# Patient Record
Sex: FEMALE | Race: WHITE | Employment: UNEMPLOYED | URBAN - METROPOLITAN AREA
[De-identification: names, ages, dates, MRNs, and addresses within clinical notes are randomized per-mention and may not be internally consistent; named-entity substitution may affect disease eponyms.]

---

## 2020-11-17 ENCOUNTER — HOSPITAL ENCOUNTER (OUTPATIENT)
Dept: MAMMOGRAPHY | Age: 55
Discharge: HOME | End: 2020-11-17
Attending: FAMILY MEDICINE
Payer: COMMERCIAL

## 2020-11-17 DIAGNOSIS — Z12.31 ENCOUNTER FOR SCREENING MAMMOGRAM FOR MALIGNANT NEOPLASM OF BREAST: ICD-10-CM

## 2020-11-17 PROCEDURE — 77063 BREAST TOMOSYNTHESIS BI: CPT | Performed by: FAMILY MEDICINE

## 2020-11-17 PROCEDURE — 77067 SCR MAMMO BI INCL CAD: CPT | Performed by: FAMILY MEDICINE

## 2020-12-13 ENCOUNTER — HOSPITAL ENCOUNTER (OUTPATIENT)
Age: 55
Discharge: HOME | End: 2020-12-13
Payer: COMMERCIAL

## 2020-12-13 VITALS
SYSTOLIC BLOOD PRESSURE: 107 MMHG | RESPIRATION RATE: 18 BRPM | TEMPERATURE: 98 F | DIASTOLIC BLOOD PRESSURE: 77 MMHG | HEART RATE: 103 BPM

## 2020-12-13 DIAGNOSIS — L72.3 INFECTED SEBACEOUS CYST: ICD-10-CM

## 2020-12-13 DIAGNOSIS — L08.9 INFECTED SEBACEOUS CYST: ICD-10-CM

## 2020-12-13 DIAGNOSIS — L02.415 ABSCESS OF RIGHT THIGH: Primary | ICD-10-CM

## 2020-12-13 PROCEDURE — 87077 CULTURE AEROBIC IDENTIFY: CPT | Performed by: NURSE PRACTITIONER

## 2020-12-13 PROCEDURE — 87205 SMEAR GRAM STAIN: CPT | Performed by: NURSE PRACTITIONER

## 2020-12-13 PROCEDURE — 87070 CULTURE OTHR SPECIMN AEROBIC: CPT | Performed by: NURSE PRACTITIONER

## 2020-12-13 PROCEDURE — 99204 OFFICE O/P NEW MOD 45 MIN: CPT

## 2020-12-13 PROCEDURE — 10061 I&D ABSCESS COMP/MULTIPLE: CPT

## 2020-12-13 PROCEDURE — 99214 OFFICE O/P EST MOD 30 MIN: CPT

## 2020-12-13 RX ORDER — SULFAMETHOXAZOLE AND TRIMETHOPRIM 800; 160 MG/1; MG/1
1 TABLET ORAL 2 TIMES DAILY
Qty: 14 TABLET | Refills: 0 | Status: SHIPPED | OUTPATIENT
Start: 2020-12-13 | End: 2020-12-20

## 2020-12-13 RX ORDER — VENLAFAXINE HYDROCHLORIDE 150 MG/1
150 CAPSULE, EXTENDED RELEASE ORAL 2 TIMES DAILY
COMMUNITY
Start: 2020-08-26

## 2020-12-13 RX ORDER — ATORVASTATIN CALCIUM 20 MG/1
TABLET, FILM COATED ORAL
COMMUNITY
End: 2021-10-11

## 2020-12-13 RX ORDER — MONTELUKAST SODIUM 10 MG/1
10 TABLET ORAL DAILY
COMMUNITY
Start: 2020-11-02

## 2020-12-13 RX ORDER — SULFAMETHOXAZOLE AND TRIMETHOPRIM 200; 40 MG/5ML; MG/5ML
SUSPENSION ORAL 2 TIMES DAILY
COMMUNITY
End: 2021-10-11

## 2020-12-13 RX ORDER — OXCARBAZEPINE 150 MG/1
150 TABLET, FILM COATED ORAL NIGHTLY
COMMUNITY
Start: 2020-11-23

## 2020-12-13 NOTE — ED PROVIDER NOTES
Patient Seen in: [facility]      History   Patient presents with:  Abscess    Stated Complaint: ABCESS ON RIGHT THIGH X 1 WK    HPI    27-year-old female presents for an infected cyst to the right thigh.   She has had a sebaceous/epidermal Cardiovascular:      Rate and Rhythm: Normal rate and regular rhythm. Pulses: Normal pulses. Heart sounds: Normal heart sounds. Pulmonary:      Effort: Pulmonary effort is normal.      Breath sounds: Normal breath sounds.    Skin:     General: thigh  (primary encounter diagnosis)  Infected sebaceous cyst    Disposition:  Discharge  12/13/2020  3:14 pm    Follow-up:  Carlos Georges [address]  [address]  [phone]      As needed          Medications Prescribed:

## 2020-12-13 NOTE — ED INITIAL ASSESSMENT (HPI)
Right posterior thigh- pt has a cyst x 10 yrs. Size increased yesterday. Redness started Tuesday. Pt on bactrim on day 3 of 7 days.  denies fever

## 2020-12-15 ENCOUNTER — HOSPITAL ENCOUNTER (OUTPATIENT)
Age: 55
Discharge: HOME | End: 2020-12-15
Payer: COMMERCIAL

## 2020-12-15 VITALS
RESPIRATION RATE: 16 BRPM | DIASTOLIC BLOOD PRESSURE: 90 MMHG | OXYGEN SATURATION: 97 % | SYSTOLIC BLOOD PRESSURE: 146 MMHG | TEMPERATURE: 98 F | HEART RATE: 96 BPM

## 2020-12-15 DIAGNOSIS — Z48.00 ABSCESS PACKING REMOVAL: ICD-10-CM

## 2020-12-15 DIAGNOSIS — Z51.89 WOUND CHECK, ABSCESS: Primary | ICD-10-CM

## 2020-12-15 PROCEDURE — 99211 OFF/OP EST MAY X REQ PHY/QHP: CPT

## 2020-12-15 NOTE — ED PROVIDER NOTES
Patient Seen in: [facility]      History   Patient presents with:  Wound Recheck    Stated Complaint: wound recheck    39-year-old female presents the immediate care for wound check and packing removal.  Patient states she had an abscess Vitals signs and nursing note reviewed. Constitutional:       Appearance: Normal appearance. HENT:      Head: Normocephalic.       Nose: Nose normal.      Mouth/Throat:      Mouth: Mucous membranes are moist.   Eyes:      Extraocular Movements: Extraocu

## 2020-12-16 RX ORDER — DOXYCYCLINE HYCLATE 100 MG/1
100 CAPSULE ORAL 2 TIMES DAILY
Qty: 14 CAPSULE | Refills: 0 | Status: SHIPPED | OUTPATIENT
Start: 2020-12-16 | End: 2020-12-23

## 2020-12-16 NOTE — ED NOTES
Pt called back she states she is still having pain , with slight oozing , with a purplish Jamul, denies any fever. Dr. Jackson Safer aware of symptoms states to started doxycyline and stop bactrim . Called pt back and relayed dr. Leslie Banks instruction .  To follow u

## 2021-04-06 ENCOUNTER — HOSPITAL ENCOUNTER (OUTPATIENT)
Dept: GENERAL RADIOLOGY | Age: 56
Discharge: HOME | End: 2021-04-06
Attending: FAMILY MEDICINE
Payer: COMMERCIAL

## 2021-04-06 DIAGNOSIS — M25.561 RIGHT KNEE PAIN: ICD-10-CM

## 2021-04-06 PROCEDURE — 73560 X-RAY EXAM OF KNEE 1 OR 2: CPT | Performed by: FAMILY MEDICINE

## 2021-10-11 ENCOUNTER — HOSPITAL ENCOUNTER (OUTPATIENT)
Age: 56
Discharge: HOME | End: 2021-10-11
Payer: COMMERCIAL

## 2021-10-11 VITALS
WEIGHT: 203 LBS | DIASTOLIC BLOOD PRESSURE: 86 MMHG | OXYGEN SATURATION: 95 % | RESPIRATION RATE: 16 BRPM | SYSTOLIC BLOOD PRESSURE: 137 MMHG | HEART RATE: 89 BPM | BODY MASS INDEX: 35.97 KG/M2 | HEIGHT: 63 IN | TEMPERATURE: 98 F

## 2021-10-11 DIAGNOSIS — L03.221 CELLULITIS OF NECK: Primary | ICD-10-CM

## 2021-10-11 PROCEDURE — 99213 OFFICE O/P EST LOW 20 MIN: CPT

## 2021-10-11 RX ORDER — CEPHALEXIN 500 MG/1
500 CAPSULE ORAL 4 TIMES DAILY
Qty: 28 CAPSULE | Refills: 0 | Status: SHIPPED | OUTPATIENT
Start: 2021-10-11 | End: 2021-10-18

## 2021-10-11 RX ORDER — VALACYCLOVIR HYDROCHLORIDE 1 G/1
1 TABLET, FILM COATED ORAL 3 TIMES DAILY
Qty: 21 TABLET | Refills: 0 | Status: SHIPPED | OUTPATIENT
Start: 2021-10-11 | End: 2021-10-18

## 2021-10-11 NOTE — ED PROVIDER NOTES
Patient Seen in: [facility]      History   Patient presents with:  Rash Skin Problem    Stated Complaint: shingles    Subjective: This is a 47-year-old female with below stated medical history.   Presents to immediate care for rash on th for stated complaint: shingles  Other systems are as noted in HPI. Constitutional and vital signs reviewed. All other systems reviewed and negative except as noted above.     Physical Exam     ED Triage Vitals [10/11/21 0954]   /86   Pulse 89 normal.               ED Course   Labs Reviewed - No data to display       DC home. [facility]      Vital signs stable. Patient is well-appearing and nontoxic looking.   Presents to immediate care for itchy patch of erythema on the right side of her neck

## 2021-10-11 NOTE — ED INITIAL ASSESSMENT (HPI)
Patient reports area of redness to right side of neck/below jaw x 10-14 days. Concerned it may be shingles. Itching and burning present.

## 2022-07-06 ENCOUNTER — HOSPITAL ENCOUNTER (OUTPATIENT)
Dept: MAMMOGRAPHY | Age: 57
Discharge: HOME | End: 2022-07-06
Attending: FAMILY MEDICINE
Payer: COMMERCIAL

## 2022-07-06 DIAGNOSIS — Z12.31 ENCOUNTER FOR SCREENING MAMMOGRAM FOR MALIGNANT NEOPLASM OF BREAST: ICD-10-CM

## 2022-07-06 PROCEDURE — 77067 SCR MAMMO BI INCL CAD: CPT | Performed by: FAMILY MEDICINE

## 2022-07-06 PROCEDURE — 77063 BREAST TOMOSYNTHESIS BI: CPT | Performed by: FAMILY MEDICINE

## 2022-07-11 ENCOUNTER — HOSPITAL ENCOUNTER (OUTPATIENT)
Dept: BONE DENSITY | Age: 57
Discharge: HOME | End: 2022-07-11
Attending: FAMILY MEDICINE
Payer: COMMERCIAL

## 2022-07-11 DIAGNOSIS — M81.0 OSTEOPOROSIS: ICD-10-CM

## 2022-07-11 PROCEDURE — 77080 DXA BONE DENSITY AXIAL: CPT | Performed by: FAMILY MEDICINE

## 2022-10-26 RX ORDER — SIMVASTATIN 20 MG
TABLET ORAL
Qty: 90 TABLET | OUTPATIENT
Start: 2022-10-26

## 2022-10-26 RX ORDER — SIMVASTATIN 20 MG
TABLET ORAL
Qty: 30 TABLET | Refills: 0 | Status: SHIPPED | OUTPATIENT
Start: 2022-10-26 | End: 2022-11-25

## 2022-11-09 ENCOUNTER — HOSPITAL ENCOUNTER (OUTPATIENT)
Age: 57
Discharge: HOME | End: 2022-11-09
Attending: STUDENT IN AN ORGANIZED HEALTH CARE EDUCATION/TRAINING PROGRAM
Payer: COMMERCIAL

## 2022-11-09 VITALS
OXYGEN SATURATION: 94 % | HEIGHT: 63 IN | BODY MASS INDEX: 35.44 KG/M2 | DIASTOLIC BLOOD PRESSURE: 84 MMHG | WEIGHT: 200 LBS | TEMPERATURE: 98 F | SYSTOLIC BLOOD PRESSURE: 150 MMHG | HEART RATE: 108 BPM | RESPIRATION RATE: 20 BRPM

## 2022-11-09 DIAGNOSIS — T78.40XA ALLERGIC REACTION, INITIAL ENCOUNTER: Primary | ICD-10-CM

## 2022-11-09 PROCEDURE — 99213 OFFICE O/P EST LOW 20 MIN: CPT

## 2022-11-09 RX ORDER — PREDNISONE 20 MG/1
40 TABLET ORAL ONCE
Status: COMPLETED | OUTPATIENT
Start: 2022-11-09 | End: 2022-11-09

## 2022-11-09 RX ORDER — PREDNISONE 20 MG/1
40 TABLET ORAL DAILY
Qty: 10 TABLET | Refills: 0 | Status: SHIPPED | OUTPATIENT
Start: 2022-11-09 | End: 2022-11-14

## 2022-11-09 RX ORDER — CETIRIZINE HYDROCHLORIDE 1 MG/ML
5 SOLUTION ORAL DAILY
COMMUNITY

## 2022-11-09 NOTE — DISCHARGE INSTRUCTIONS
Return to the emergency room immediately if you develop shortness of breath, wheezing sensation of throat closing with associated rash, vomiting or diarrhea

## 2022-11-09 NOTE — ED INITIAL ASSESSMENT (HPI)
Rash- on the face, torso, both arms, both groin, c/o itching . Pt was prescribed new medication prescribed back in October took it for 5-6 days pt developed  Constipation and  sleepiness then stooped taking it. After her vacation she restarted oct 29  She developed rash which started Monday 11/7/22 . Pt called her psychiatrist and was advised to go to the Immediate care. Took benadryl, and anti itch lotion but no relief.

## 2022-11-25 RX ORDER — SIMVASTATIN 20 MG
TABLET ORAL
Qty: 90 TABLET | OUTPATIENT
Start: 2022-11-25

## 2022-11-25 RX ORDER — SIMVASTATIN 20 MG
TABLET ORAL
Qty: 30 TABLET | Refills: 0 | Status: SHIPPED | OUTPATIENT
Start: 2022-11-25

## 2022-12-19 RX ORDER — SIMVASTATIN 20 MG
TABLET ORAL
Qty: 30 TABLET | Refills: 0 | OUTPATIENT
Start: 2022-12-19

## 2023-04-18 ENCOUNTER — APPOINTMENT (OUTPATIENT)
Dept: GENERAL RADIOLOGY | Age: 58
End: 2023-04-18
Attending: NURSE PRACTITIONER
Payer: COMMERCIAL

## 2023-04-18 ENCOUNTER — HOSPITAL ENCOUNTER (OUTPATIENT)
Age: 58
Discharge: HOME | End: 2023-04-18
Payer: COMMERCIAL

## 2023-04-18 VITALS
HEIGHT: 63 IN | DIASTOLIC BLOOD PRESSURE: 98 MMHG | RESPIRATION RATE: 20 BRPM | BODY MASS INDEX: 35.79 KG/M2 | OXYGEN SATURATION: 95 % | SYSTOLIC BLOOD PRESSURE: 135 MMHG | HEART RATE: 85 BPM | WEIGHT: 202 LBS | TEMPERATURE: 97 F

## 2023-04-18 DIAGNOSIS — J06.9 UPPER RESPIRATORY TRACT INFECTION, UNSPECIFIED TYPE: Primary | ICD-10-CM

## 2023-04-18 DIAGNOSIS — R05.3 PERSISTENT COUGH: ICD-10-CM

## 2023-04-18 LAB — SARS-COV-2 RNA RESP QL NAA+PROBE: NOT DETECTED

## 2023-04-18 PROCEDURE — 71046 X-RAY EXAM CHEST 2 VIEWS: CPT | Performed by: NURSE PRACTITIONER

## 2023-04-18 PROCEDURE — 99214 OFFICE O/P EST MOD 30 MIN: CPT

## 2023-04-18 PROCEDURE — 94664 DEMO&/EVAL PT USE INHALER: CPT

## 2023-04-18 RX ORDER — BENZONATATE 100 MG/1
100 CAPSULE ORAL 3 TIMES DAILY PRN
Qty: 30 CAPSULE | Refills: 0 | Status: SHIPPED | OUTPATIENT
Start: 2023-04-18 | End: 2023-05-18

## 2023-04-18 RX ORDER — ALBUTEROL SULFATE 90 UG/1
2 AEROSOL, METERED RESPIRATORY (INHALATION) EVERY 4 HOURS PRN
Qty: 1 EACH | Refills: 0 | Status: SHIPPED | OUTPATIENT
Start: 2023-04-18 | End: 2023-05-18

## 2023-04-18 RX ORDER — PREDNISONE 20 MG/1
20 TABLET ORAL 2 TIMES DAILY
Qty: 10 TABLET | Refills: 0 | Status: SHIPPED | OUTPATIENT
Start: 2023-04-18 | End: 2023-04-23

## 2023-05-05 RX ORDER — MONTELUKAST SODIUM 10 MG/1
TABLET ORAL
Qty: 90 TABLET | Refills: 0 | Status: SHIPPED | OUTPATIENT
Start: 2023-05-05

## 2023-08-01 RX ORDER — MONTELUKAST SODIUM 10 MG/1
TABLET ORAL
Qty: 90 TABLET | Refills: 0 | OUTPATIENT
Start: 2023-08-01

## 2023-09-10 PROBLEM — G25.71 AKATHISIA: Status: ACTIVE | Noted: 2023-09-10

## 2023-09-10 PROBLEM — E78.1 HYPERTRIGLYCERIDEMIA: Status: ACTIVE | Noted: 2023-09-10

## 2023-09-11 ENCOUNTER — OFFICE VISIT (OUTPATIENT)
Dept: FAMILY MEDICINE CLINIC | Facility: CLINIC | Age: 58
End: 2023-09-11
Payer: COMMERCIAL

## 2023-09-11 ENCOUNTER — LAB ENCOUNTER (OUTPATIENT)
Dept: LAB | Age: 58
End: 2023-09-11
Attending: FAMILY MEDICINE
Payer: COMMERCIAL

## 2023-09-11 VITALS
SYSTOLIC BLOOD PRESSURE: 108 MMHG | WEIGHT: 201 LBS | HEIGHT: 63 IN | OXYGEN SATURATION: 96 % | DIASTOLIC BLOOD PRESSURE: 80 MMHG | RESPIRATION RATE: 16 BRPM | TEMPERATURE: 98 F | HEART RATE: 88 BPM | BODY MASS INDEX: 35.61 KG/M2

## 2023-09-11 DIAGNOSIS — Z00.00 LABORATORY EXAM ORDERED AS PART OF ROUTINE GENERAL MEDICAL EXAMINATION: ICD-10-CM

## 2023-09-11 DIAGNOSIS — J30.89 NON-SEASONAL ALLERGIC RHINITIS, UNSPECIFIED TRIGGER: ICD-10-CM

## 2023-09-11 DIAGNOSIS — M25.562 CHRONIC PAIN OF BOTH KNEES: ICD-10-CM

## 2023-09-11 DIAGNOSIS — Z00.00 ENCOUNTER FOR MEDICAL EXAMINATION TO ESTABLISH CARE: Primary | ICD-10-CM

## 2023-09-11 DIAGNOSIS — R73.01 IMPAIRED FASTING GLUCOSE: ICD-10-CM

## 2023-09-11 DIAGNOSIS — N89.8 VAGINAL IRRITATION: ICD-10-CM

## 2023-09-11 DIAGNOSIS — Z23 IMMUNIZATION DUE: ICD-10-CM

## 2023-09-11 DIAGNOSIS — G89.29 CHRONIC PAIN OF BOTH KNEES: ICD-10-CM

## 2023-09-11 DIAGNOSIS — Z12.4 ENCOUNTER FOR SCREENING FOR CERVICAL CANCER: ICD-10-CM

## 2023-09-11 DIAGNOSIS — M79.644 CHRONIC PAIN OF RIGHT THUMB: ICD-10-CM

## 2023-09-11 DIAGNOSIS — M79.644 PAIN IN RIGHT FINGER(S): ICD-10-CM

## 2023-09-11 DIAGNOSIS — N94.11 SUPERFICIAL DYSPAREUNIA: ICD-10-CM

## 2023-09-11 DIAGNOSIS — M25.561 CHRONIC PAIN OF BOTH KNEES: ICD-10-CM

## 2023-09-11 DIAGNOSIS — G89.29 CHRONIC PAIN OF RIGHT THUMB: ICD-10-CM

## 2023-09-11 LAB
ALBUMIN SERPL-MCNC: 3.7 G/DL (ref 3.4–5)
ALBUMIN/GLOB SERPL: 0.9 {RATIO} (ref 1–2)
ALP LIVER SERPL-CCNC: 95 U/L
ALT SERPL-CCNC: 51 U/L
ANION GAP SERPL CALC-SCNC: 5 MMOL/L (ref 0–18)
AST SERPL-CCNC: 30 U/L (ref 15–37)
BASOPHILS # BLD AUTO: 0.05 X10(3) UL (ref 0–0.2)
BASOPHILS NFR BLD AUTO: 0.8 %
BILIRUB SERPL-MCNC: 0.2 MG/DL (ref 0.1–2)
BUN BLD-MCNC: 12 MG/DL (ref 7–18)
CALCIUM BLD-MCNC: 9.8 MG/DL (ref 8.5–10.1)
CHLORIDE SERPL-SCNC: 107 MMOL/L (ref 98–112)
CHOLEST SERPL-MCNC: 296 MG/DL (ref ?–200)
CO2 SERPL-SCNC: 28 MMOL/L (ref 21–32)
CREAT BLD-MCNC: 0.74 MG/DL
EGFRCR SERPLBLD CKD-EPI 2021: 93 ML/MIN/1.73M2 (ref 60–?)
EOSINOPHIL # BLD AUTO: 0.35 X10(3) UL (ref 0–0.7)
EOSINOPHIL NFR BLD AUTO: 5.5 %
ERYTHROCYTE [DISTWIDTH] IN BLOOD BY AUTOMATED COUNT: 13.9 %
FASTING PATIENT LIPID ANSWER: YES
FASTING STATUS PATIENT QL REPORTED: YES
GLOBULIN PLAS-MCNC: 4 G/DL (ref 2.8–4.4)
GLUCOSE BLD-MCNC: 103 MG/DL (ref 70–99)
HCT VFR BLD AUTO: 47.4 %
HDLC SERPL-MCNC: 45 MG/DL (ref 40–59)
HGB BLD-MCNC: 15.3 G/DL
IMM GRANULOCYTES # BLD AUTO: 0.01 X10(3) UL (ref 0–1)
IMM GRANULOCYTES NFR BLD: 0.2 %
LDLC SERPL CALC-MCNC: 176 MG/DL (ref ?–100)
LYMPHOCYTES # BLD AUTO: 2.71 X10(3) UL (ref 1–4)
LYMPHOCYTES NFR BLD AUTO: 42.5 %
MCH RBC QN AUTO: 30.7 PG (ref 26–34)
MCHC RBC AUTO-ENTMCNC: 32.3 G/DL (ref 31–37)
MCV RBC AUTO: 95.2 FL
MONOCYTES # BLD AUTO: 0.51 X10(3) UL (ref 0.1–1)
MONOCYTES NFR BLD AUTO: 8 %
NEUTROPHILS # BLD AUTO: 2.74 X10 (3) UL (ref 1.5–7.7)
NEUTROPHILS # BLD AUTO: 2.74 X10(3) UL (ref 1.5–7.7)
NEUTROPHILS NFR BLD AUTO: 43 %
NONHDLC SERPL-MCNC: 251 MG/DL (ref ?–130)
OSMOLALITY SERPL CALC.SUM OF ELEC: 290 MOSM/KG (ref 275–295)
PLATELET # BLD AUTO: 325 10(3)UL (ref 150–450)
POTASSIUM SERPL-SCNC: 4.4 MMOL/L (ref 3.5–5.1)
PROT SERPL-MCNC: 7.7 G/DL (ref 6.4–8.2)
RBC # BLD AUTO: 4.98 X10(6)UL
SODIUM SERPL-SCNC: 140 MMOL/L (ref 136–145)
TRIGL SERPL-MCNC: 382 MG/DL (ref 30–149)
TSI SER-ACNC: 2.15 MIU/ML (ref 0.36–3.74)
VLDLC SERPL CALC-MCNC: 80 MG/DL (ref 0–30)
WBC # BLD AUTO: 6.4 X10(3) UL (ref 4–11)

## 2023-09-11 PROCEDURE — 36415 COLL VENOUS BLD VENIPUNCTURE: CPT

## 2023-09-11 PROCEDURE — 80061 LIPID PANEL: CPT

## 2023-09-11 PROCEDURE — 85025 COMPLETE CBC W/AUTO DIFF WBC: CPT

## 2023-09-11 PROCEDURE — 80053 COMPREHEN METABOLIC PANEL: CPT

## 2023-09-11 PROCEDURE — 87624 HPV HI-RISK TYP POOLED RSLT: CPT | Performed by: FAMILY MEDICINE

## 2023-09-11 PROCEDURE — 83036 HEMOGLOBIN GLYCOSYLATED A1C: CPT

## 2023-09-11 PROCEDURE — 88175 CYTOPATH C/V AUTO FLUID REDO: CPT | Performed by: FAMILY MEDICINE

## 2023-09-11 PROCEDURE — 84443 ASSAY THYROID STIM HORMONE: CPT

## 2023-09-11 RX ORDER — MONTELUKAST SODIUM 10 MG/1
10 TABLET ORAL DAILY
Qty: 90 TABLET | Refills: 3 | Status: SHIPPED | OUTPATIENT
Start: 2023-09-11

## 2023-09-11 RX ORDER — CLOBETASOL PROPIONATE 0.5 MG/G
OINTMENT TOPICAL
Qty: 45 G | Refills: 0 | Status: SHIPPED | OUTPATIENT
Start: 2023-09-11

## 2023-09-12 ENCOUNTER — TELEPHONE (OUTPATIENT)
Dept: FAMILY MEDICINE CLINIC | Facility: CLINIC | Age: 58
End: 2023-09-12

## 2023-09-12 ENCOUNTER — MED REC SCAN ONLY (OUTPATIENT)
Dept: FAMILY MEDICINE CLINIC | Facility: CLINIC | Age: 58
End: 2023-09-12

## 2023-09-12 LAB
EST. AVERAGE GLUCOSE BLD GHB EST-MCNC: 128 MG/DL (ref 68–126)
HBA1C MFR BLD: 6.1 % (ref ?–5.7)
HPV I/H RISK 1 DNA SPEC QL NAA+PROBE: NEGATIVE

## 2023-09-14 PROBLEM — E78.2 MIXED HYPERLIPIDEMIA: Status: ACTIVE | Noted: 2023-09-14

## 2023-10-10 ENCOUNTER — OFFICE VISIT (OUTPATIENT)
Dept: FAMILY MEDICINE CLINIC | Facility: CLINIC | Age: 58
End: 2023-10-10
Payer: COMMERCIAL

## 2023-10-10 VITALS
HEART RATE: 90 BPM | HEIGHT: 64 IN | RESPIRATION RATE: 16 BRPM | OXYGEN SATURATION: 96 % | TEMPERATURE: 97 F | SYSTOLIC BLOOD PRESSURE: 124 MMHG | WEIGHT: 199 LBS | BODY MASS INDEX: 33.97 KG/M2 | DIASTOLIC BLOOD PRESSURE: 82 MMHG

## 2023-10-10 DIAGNOSIS — R73.03 PREDIABETES: ICD-10-CM

## 2023-10-10 DIAGNOSIS — Z12.31 ENCOUNTER FOR SCREENING MAMMOGRAM FOR MALIGNANT NEOPLASM OF BREAST: ICD-10-CM

## 2023-10-10 DIAGNOSIS — E78.2 MIXED HYPERLIPIDEMIA: ICD-10-CM

## 2023-10-10 DIAGNOSIS — E66.09 CLASS 1 OBESITY DUE TO EXCESS CALORIES WITH SERIOUS COMORBIDITY AND BODY MASS INDEX (BMI) OF 34.0 TO 34.9 IN ADULT: ICD-10-CM

## 2023-10-10 DIAGNOSIS — F31.9 BIPOLAR AFFECTIVE DISORDER, REMISSION STATUS UNSPECIFIED (HCC): ICD-10-CM

## 2023-10-10 DIAGNOSIS — Z00.01 ENCOUNTER FOR GENERAL ADULT MEDICAL EXAMINATION WITH ABNORMAL FINDINGS: Primary | ICD-10-CM

## 2023-10-10 DIAGNOSIS — K21.9 GERD WITHOUT ESOPHAGITIS: ICD-10-CM

## 2023-10-10 PROCEDURE — 99213 OFFICE O/P EST LOW 20 MIN: CPT | Performed by: FAMILY MEDICINE

## 2023-10-10 PROCEDURE — 99396 PREV VISIT EST AGE 40-64: CPT | Performed by: FAMILY MEDICINE

## 2023-10-10 PROCEDURE — 3074F SYST BP LT 130 MM HG: CPT | Performed by: FAMILY MEDICINE

## 2023-10-10 PROCEDURE — 3008F BODY MASS INDEX DOCD: CPT | Performed by: FAMILY MEDICINE

## 2023-10-10 PROCEDURE — 3079F DIAST BP 80-89 MM HG: CPT | Performed by: FAMILY MEDICINE

## 2023-10-10 RX ORDER — PANTOPRAZOLE SODIUM 20 MG/1
20 TABLET, DELAYED RELEASE ORAL
Qty: 90 TABLET | Refills: 1 | Status: SHIPPED | OUTPATIENT
Start: 2023-10-10

## 2023-10-27 ENCOUNTER — HOSPITAL ENCOUNTER (OUTPATIENT)
Dept: MAMMOGRAPHY | Age: 58
Discharge: HOME | End: 2023-10-27
Attending: FAMILY MEDICINE

## 2023-10-27 DIAGNOSIS — Z12.31 ENCOUNTER FOR SCREENING MAMMOGRAM FOR MALIGNANT NEOPLASM OF BREAST: ICD-10-CM

## 2023-10-27 PROCEDURE — 77063 BREAST TOMOSYNTHESIS BI: CPT | Performed by: FAMILY MEDICINE

## 2023-10-27 PROCEDURE — 77067 SCR MAMMO BI INCL CAD: CPT | Performed by: FAMILY MEDICINE

## 2024-01-09 RX ORDER — PANTOPRAZOLE SODIUM 20 MG/1
20 TABLET, DELAYED RELEASE ORAL
Qty: 90 TABLET | Refills: 0 | Status: SHIPPED | OUTPATIENT
Start: 2024-01-09

## 2024-01-09 NOTE — TELEPHONE ENCOUNTER
LOV 10/10/2023     LAST LAB 9/12/23     LAST RX   pantoprazole 20 MG Oral Tab EC 90 tablet 1 10/10/2023 --   Sig:   Take 1 tablet (20 mg total) by mouth every morning before breakfast         Next OV No future appointments.     PROTOCOL none

## 2024-03-10 DIAGNOSIS — E78.2 MIXED HYPERLIPIDEMIA: ICD-10-CM

## 2024-03-11 RX ORDER — SIMVASTATIN 20 MG
20 TABLET ORAL NIGHTLY
Qty: 90 TABLET | Refills: 0 | Status: SHIPPED | OUTPATIENT
Start: 2024-03-11

## 2024-03-11 NOTE — TELEPHONE ENCOUNTER
Cholesterol Medication Protocol Vreblc15/10/2024 05:53 AM   Protocol Details ALT < 80    ALT resulted within past year    Lipid panel within past 12 months    In person appointment or virtual visit in the past 12 mos or appointment in next 3 mos        LOV 10/10/23     LAST LAB  9/11/23    LAST RX 9/14/23 90     Next OV No future appointments.      PROTOCOL pass

## 2024-05-25 ENCOUNTER — HOSPITAL ENCOUNTER (OUTPATIENT)
Age: 59
Discharge: HOME | End: 2024-05-25
Attending: STUDENT IN AN ORGANIZED HEALTH CARE EDUCATION/TRAINING PROGRAM

## 2024-05-25 ENCOUNTER — APPOINTMENT (OUTPATIENT)
Dept: GENERAL RADIOLOGY | Age: 59
End: 2024-05-25
Attending: PHYSICIAN ASSISTANT

## 2024-05-25 VITALS
RESPIRATION RATE: 20 BRPM | HEART RATE: 92 BPM | TEMPERATURE: 97 F | HEIGHT: 63.5 IN | OXYGEN SATURATION: 96 % | WEIGHT: 207 LBS | SYSTOLIC BLOOD PRESSURE: 169 MMHG | DIASTOLIC BLOOD PRESSURE: 90 MMHG | BODY MASS INDEX: 36.22 KG/M2

## 2024-05-25 DIAGNOSIS — M79.671 PAIN OF RIGHT HEEL: Primary | ICD-10-CM

## 2024-05-25 PROCEDURE — 99213 OFFICE O/P EST LOW 20 MIN: CPT

## 2024-05-25 PROCEDURE — 73630 X-RAY EXAM OF FOOT: CPT | Performed by: PHYSICIAN ASSISTANT

## 2024-05-25 PROCEDURE — 73610 X-RAY EXAM OF ANKLE: CPT | Performed by: PHYSICIAN ASSISTANT

## 2024-05-25 RX ORDER — ARM BRACE
EACH MISCELLANEOUS
Qty: 1 EACH | Refills: 0 | Status: SHIPPED | OUTPATIENT
Start: 2024-05-25

## 2024-05-25 NOTE — ED INITIAL ASSESSMENT (HPI)
Pt. States her right ankle has been sore since last September however yesterday she was walking her dog and afterwards during the night her ankle became quite painful. She states she iced her ankle and it seemed to help a bit.

## 2024-05-25 NOTE — ED PROVIDER NOTES
Patient Seen in: [facility]      History     Chief Complaint   Patient presents with    Leg or Foot Injury     Stated Complaint: right ankle swollen, sore, shooting waves of pain    Subjective:   HPI    Patient is a 58-year-old female is presenting immediate care center with complaints of posterior calcaneus/heel pain.  Patient states he has been having pain on and off in that area for several months.  6 months if not longer.  Sometimes it acts up more than others.  Yesterday she went for a mile half walk with her dog and when she got home she rested and then after a few hours that she started noticing that the pain was increasing in that location.  She points to the posterior aspect of her ankle near the calcaneus.  No foot pain.  Mild lateral ankle pain but most of her pain seems to be in the location of the calcaneus.    Objective:   Past Medical History:    Allergic rhinitis    All year around, indoor & outdoor    Anxiety    Arthritis    Asthma (HCC)    Life long, not diagnosed until 1990s    Bipolar 2 disorder (HCC)    Calculus of kidney    3 incidents, last one in mid 1990s    Depression    Diagnosed 1994    Esophageal reflux    Hyperlipidemia    Kidney stones    Seasonal allergies              Past Surgical History:   Procedure Laterality Date    Cholecystectomy  2012    Full surgery with complications. 9 days in patients    Colonoscopy  2018    Hip surgery      left hip labral tear    Hymenotomy simple incision                  Social History     Socioeconomic History    Marital status:    Tobacco Use    Smoking status: Never    Smokeless tobacco: Never    Tobacco comments:     Life time 3 cigarettes   Vaping Use    Vaping status: Never Used   Substance and Sexual Activity    Alcohol use: Yes     Alcohol/week: 4.0 standard drinks of alcohol     Types: 1 Glasses of wine, 2 Cans of beer, 1 Shots of liquor per week     Comment: occasionally    Drug use: Not Currently     Types: Cannabis      Comment: Last use 1979   Other Topics Concern    Caffeine Concern No    Exercise No    Seat Belt No    Weight Concern Yes              Review of Systems   Musculoskeletal:         Ankle/heel pain       Positive for stated complaint: right ankle swollen, sore, shooting waves of pain  Other systems are as noted in HPI.  Constitutional and vital signs reviewed.      All other systems reviewed and negative except as noted above.    Physical Exam     ED Triage Vitals [05/25/24 1123]   BP (!) 169/90   Pulse 92   Resp 20   Temp 97 °F (36.1 °C)   Temp src Temporal   SpO2 96 %   O2 Device None (Room air)       Current Vitals:   Vital Signs  BP: (!) 169/90  Pulse: 92  Resp: 20  Temp: 97 °F (36.1 °C)  Temp src: Temporal    Oxygen Therapy  SpO2: 96 %  O2 Device: None (Room air)            Physical Exam  Vitals and nursing note reviewed.   Constitutional:       Appearance: Normal appearance.   Musculoskeletal:      Comments: Examination of the right ankle compared to left shows that there is no significant swelling or bruising or sign of trauma.  The patient has very minimal tenderness of the posterior aspect of the lateral malleoli region the right ankle.  The bony prominences of the medial lateral malleoli are soft nontender.  There is no tenderness upon palpation over the foot.  Good color strong dorsalis pedis and posterior tibialis.  The patient was placed in prone position.  She has no tenderness or sign of trauma along the calf or the Achilles tendon.  But she does have tenderness right where the Achilles tendon inserts on the heel on the lateral border.  Again no redness no swelling.  No sign of infection or trauma.  The remainder of the Achilles tendon is soft.  Bruce test shows good movement of the foot with plantarflexion   Skin:     General: Skin is warm and dry.      Capillary Refill: Capillary refill takes less than 2 seconds.   Neurological:      General: No focal deficit present.      Mental Status: She is  alert and oriented to person, place, and time.      Cranial Nerves: No cranial nerve deficit.      Motor: No weakness.               ED Course   Labs Reviewed - No data to display       ED Course as of 05/25/24 1624  ------------------------------------------------------------  Time: 05/25 1245  Comment: Discussed the above findings with the patient.  Will fit her for an Ace wrap to help support her foot and ankle.  Advised her to follow-up with the orthopedic surgeon     XR FOOT, COMPLETE (MIN 3 VIEWS), RIGHT (CPT=73630)    Result Date: 5/25/2024  PROCEDURE:  XR FOOT, COMPLETE (MIN 3 VIEWS), RIGHT (CPT=73630)  TECHNIQUE:  AP, oblique, and lateral views were obtained.  COMPARISON:  None.  INDICATIONS:  right ankle swollen, most of the pain seems to be in the calcaneal region where the Achilles tendon inserts laterally  PATIENT STATED HISTORY: (As transcribed by Technologist)  Patient has had chronic right ankle joint pain for several months.  Patient developed sharp pain in her right heel after walking her dog last night.    FINDINGS:  Scattered mild peripheral arthritis.  No fracture, expansile lesion or erosion.  Moderate-sized corticated plantar heel spur.  No adjacent soft tissue calcifications.  Small enthesophyte at the insertion of the Achilles tendon on the calcaneus.             CONCLUSION:  1. Calcaneal spurs. 2. Mild peripheral arthritis.    LOCATION:  [facility]   Dictated by (CST): Maynor Mack MD on 5/25/2024 at 12:25 PM     Finalized by (CST): Maynor Mack MD on 5/25/2024 at 12:25 PM       XR ANKLE (MIN 3 VIEWS), RIGHT (CPT=73610)    Result Date: 5/25/2024  PROCEDURE:  XR ANKLE (MIN 3 VIEWS), RIGHT (CPT=73610)  TECHNIQUE:  Three views were obtained.  COMPARISON:  None.  INDICATIONS:  right ankle swollen, sore, shooting waves of pain  PATIENT STATED HISTORY: (As transcribed by Technologist)  Patient has had chronic right ankle joint pain for several months.  Patient developed sharp pain in her right heel  after walking her dog last night.    FINDINGS:  Joint spaces are normal.  No fracture, expansile lesion or erosion.  Moderate-sized corticated plantar heel spur.  No adjacent soft tissue calcifications.  Small enthesophyte at the insertion of the Achilles tendon on calcaneus.             CONCLUSION:  Calcaneal spurs.   LOCATION:  [facility]   Dictated by (CST): Maynor Mack MD on 5/25/2024 at 12:24 PM     Finalized by (CST): Maynor Mack MD on 5/25/2024 at 12:24 PM               MDM      Pertinent Labs & Imaging studies reviewed. (See chart for details)  Differential diagnosis considered but not limited to: Sprain, strain, arthritis, tendinitis,  Patient coming in with the above complaint.  Above findings are noted.  Patient will be discharged home.  Patient instructed to follow-up with the orthopedic surgeon.  Patient placed into a Ace wrap to help support her foot but also provided her for a note to  a orthopedic shoe.  Very close follow-up with the orthopedic surgeon. Patient provided with pain medication. Radiology . Patient is comfortable with this plan.    Overall Pt looks good. Non-toxic, well-hydrated and in no respiratory distress. Vital signs are reassuring. Exam is reassuring. I do not believe pt  requires and additional  diagnostic studiesor intervention. I believe pt  can be discharged home to continue evaluation as an outpatient. Follow-up provider given. Discharge instructions given and reviewed. Return for any problems. All understand and agreewith the plan.    Please note that this report has been produced using speech recognition software and may contain errors related to that system including, but not limited to, errors in grammar, punctuation, and spelling, as well as words and phrases that possibly may have been recognized inappropriately.  If there are any questions or concerns, contact the dictating provider for clarification.                                    Medical Decision  Making  Problems Addressed:  Pain of right heel: undiagnosed new problem with uncertain prognosis        Disposition and Plan     Clinical Impression:  1. Pain of right heel         Disposition:  Discharge  5/25/2024 12:55 pm    Follow-up:  Bran Valle PA  [address] [place] [address]  [phone]    Call in 1 day            Medications Prescribed:  Discharge Medication List as of 5/25/2024 12:59 PM

## 2024-05-25 NOTE — DISCHARGE INSTRUCTIONS
Rest, take Tylenol or ibuprofen to help with pain.  Recommend using the orthopedic boot to help support your ankle and foot to reduce your pain.    Very close follow-up with orthopedic surgeon I provided you with or at least your primary care physician.    Monitor your blood pressure at home it was elevated during this visit    Return to ER or emergency room for new or worsening symptoms

## 2024-05-28 ENCOUNTER — TELEPHONE (OUTPATIENT)
Dept: ORTHOPEDICS CLINIC | Facility: CLINIC | Age: 59
End: 2024-05-28

## 2024-05-31 NOTE — TELEPHONE ENCOUNTER
As per Sincer, she should be rescheduled with Dr. Wilson  
LMOM asking patient to contact our office to get rescheduled with Dr. Wilson.   
LMOM asking patient to contact our office to get rescheduled with Dr. Wilson. I also let the patient know I will be canceling the appointment as well.   
Patient is coming in for right foot and heel bone spurs  Referred from Immediate care.    XRAY's in EP[facility]  Please advise if any additional imaging is needed    Future Appointments   Date Time Provider Department Center   6/3/2024 11:40 AM [id number]      
General

## 2024-06-11 ENCOUNTER — OFFICE VISIT (OUTPATIENT)
Dept: ORTHOPEDICS CLINIC | Facility: CLINIC | Age: 59
End: 2024-06-11
Payer: COMMERCIAL

## 2024-06-11 VITALS — WEIGHT: 207 LBS | BODY MASS INDEX: 36.22 KG/M2 | HEIGHT: 63.5 IN

## 2024-06-11 DIAGNOSIS — M77.51 RETROCALCANEAL BURSITIS (BACK OF HEEL), RIGHT: Primary | ICD-10-CM

## 2024-06-11 DIAGNOSIS — M76.61 ACHILLES TENDINITIS OF RIGHT LOWER EXTREMITY: ICD-10-CM

## 2024-06-11 RX ORDER — OXCARBAZEPINE 300 MG/1
300 TABLET, FILM COATED ORAL EVERY 24 HOURS
COMMUNITY
Start: 2024-05-29

## 2024-06-11 RX ORDER — VENLAFAXINE HYDROCHLORIDE 75 MG/1
75 CAPSULE, EXTENDED RELEASE ORAL DAILY
COMMUNITY
Start: 2024-05-29

## 2024-06-11 RX ORDER — TRAZODONE HYDROCHLORIDE 150 MG/1
150 TABLET ORAL NIGHTLY PRN
COMMUNITY
Start: 2024-05-29

## 2024-06-11 RX ORDER — BETAMETHASONE SODIUM PHOSPHATE AND BETAMETHASONE ACETATE 3; 3 MG/ML; MG/ML
4.2 INJECTION, SUSPENSION INTRA-ARTICULAR; INTRALESIONAL; INTRAMUSCULAR; SOFT TISSUE ONCE
Status: COMPLETED | OUTPATIENT
Start: 2024-06-11 | End: 2024-06-11

## 2024-06-11 RX ADMIN — BETAMETHASONE SODIUM PHOSPHATE AND BETAMETHASONE ACETATE 4.2 MG: 3; 3 INJECTION, SUSPENSION INTRA-ARTICULAR; INTRALESIONAL; INTRAMUSCULAR; SOFT TISSUE at 10:58:00

## 2024-06-11 NOTE — PROGRESS NOTES
[facility] New Patient Note    CC:   Chief Complaint   Patient presents with    Foot Pain     Right foot pain and heel pain;   DX with bone spur at ;   ONSET: August or Sept. Of 2023;  Pain Score: @peak 7       HPI: The patient is a 58 year old female who presents today with complaints of right heel pain back of heel area , more on outside.  Sometimes swell  Can be a burning type pain at times    Injury?  Not sure  No repetitive activity .  Walks , dancing and biking          Past Medical History:    Allergic rhinitis    All year around, indoor & outdoor    Anxiety    Arthritis    Asthma (HCC)    Life long, not diagnosed until 1990s    Bipolar 2 disorder (HCC)    Calculus of kidney    3 incidents, last one in mid 1990s    Depression    Diagnosed 1994    Esophageal reflux    Hyperlipidemia    Kidney stones    Seasonal allergies     Past Surgical History:   Procedure Laterality Date    Cholecystectomy  2012    Full surgery with complications. 9 days in patients    Colonoscopy  2018    Hip surgery      left hip labral tear    Hymenotomy simple incision       Current Outpatient Medications   Medication Sig Dispense Refill    traZODone 150 MG Oral Tab Take 1 tablet (150 mg total) by mouth nightly as needed for Sleep.      OXcarbazepine 300 MG Oral Tab Take 1 tablet (300 mg total) by mouth daily.      venlafaxine ER 75 MG Oral Capsule SR 24 Hr Take 1 capsule (75 mg total) by mouth daily.      Elastic Bandages & Supports (ACE ANKLE BRACE) Does not apply Misc Please provide patient with a CAM/walking boot. 1 each 0    SIMVASTATIN 20 MG Oral Tab TAKE 1 TABLET(20 MG) BY MOUTH EVERY NIGHT 90 tablet 0    pantoprazole 20 MG Oral Tab EC TAKE 1 TABLET(20 MG) BY MOUTH EVERY MORNING BEFORE BREAKFAST 90 tablet 0    clobetasol 0.05 % External Ointment Apply to the affected area on the Vulva nightly for 4 wks 45 g 0    montelukast 10 MG Oral Tab Take 1 tablet (10 mg total) by mouth daily. 90 tablet 3    cetirizine 1 MG/ML  Oral Solution Take 5 mL (5 mg total) by mouth daily.      Venlafaxine HCl  MG Oral Capsule SR 24 Hr Take 1 capsule (150 mg total) by mouth 2 (two) times daily.       Allergies   Allergen Reactions    Other UNKNOWN    Wellbutrin [Bupropion] OTHER (SEE COMMENTS)     Skin breaks out when touched    Lamictal [Lamotrigine] RASH    Penicillins RASH     Family History   Problem Relation Age of Onset    Depression Mother         Denial    Heart Disorder Mother         Pacemaker in late 80s    Lipids Mother         High triglycerides    Musculo-skelatal Disorder Mother         Osteoarthritis    Psychiatric Mother     Cancer Father         Kidney    Heart Disorder Maternal Grandmother         Angina    Stroke Maternal Grandmother     Cancer Maternal Grandfather         Naif    Depression Maternal Grandfather         Hospitalzed at least twice, received shock treatment in the 1930s    Psychiatric Maternal Grandfather     Cancer Paternal Grandmother     Breast Cancer Maternal Aunt 75        mid to late 70's    Breast Cancer Maternal Aunt 60     Social History     Occupational History    Not on file   Tobacco Use    Smoking status: Never    Smokeless tobacco: Never    Tobacco comments:     Life time 3 cigarettes   Vaping Use    Vaping status: Never Used   Substance and Sexual Activity    Alcohol use: Yes     Alcohol/week: 4.0 standard drinks of alcohol     Types: 1 Glasses of wine, 2 Cans of beer, 1 Shots of liquor per week     Comment: occasionally    Drug use: Not Currently     Types: Cannabis     Comment: Last use 1979    Sexual activity: Not on file        ROS:  Complete ROS reviewed by me and non-contributory to the chief complaint except as mentioned above.    Physical Exam:    Ht 5' 3.5\" (1.613 m)   Wt 207 lb (93.9 kg)   BMI 36.09 kg/m²       Exam right foot and ankle, tenderness about the posterior lateral aspect of the right heel and ankle.  Mild insertional pain at the Achilles tendon onto the posterior  calcaneus. No thickening of achilles tendon. No weakness.  No side to side compression pain of the heel.  Full range of motion of the ankle joint and subtalar joint  Sensation is intact sharp versus dull.  She can feel light touch to the tips of the toes  Palpable pedal pulses.  Hair growth is present.  Skin is supple and feet are well perfused and warm.    Strength is 5 out of 5 all muscle groups    Full range of motion and nonpainful motion of the ankle joint, subtalar joint, MP joints, and midfoot joints.     Imaging: X-rays show a large inferior calcaneal spur and small calcification within the Achilles tendon insertion.  Personally viewed, independently interpreted and radiology report read.      Assessment/Diagnoses:  Diagnoses and all orders for this visit:    Retrocalcaneal bursitis (back of heel), right    Achilles tendinitis of right lower extremity        Plan:  I reviewed imaging and exam findings with the patient.  We looked at her x-rays together and discussed the significance of the spur.  She has tightness of the Achilles tendon and insertional tendinitis but more painful is the bursitis posterior lateral aspect of the heel.    Offered an injection of steroid to see if this will calm it down she tolerated it well.    Risks and benefits discussed.  Sterile prep of affected heel.  Injection of .7cc of betamethasone phosphate to painful area of posterolateral heel.     Icing and rest of foot for 1-2 days    PT -  LE eval and treat  Modalities, graston and dry needling if available  Gait eval     Follow up if not improving -     Sangita Wilson, DPMPodiatric Surgery  [facility]  [address] [place] [address], [address] [address]   [address] [place] [address]   [address] [place] [address]  [email or web address]  [email or web address]  t: [phone]   f: [phone]              This document was partially prepared using Dragon Medical voice recognition software.

## 2024-07-21 DIAGNOSIS — N89.8 VAGINAL IRRITATION: ICD-10-CM

## 2024-07-24 NOTE — TELEPHONE ENCOUNTER
Please Review. Protocol Failed; No Protocol     Requested Prescriptions   Pending Prescriptions Disp Refills    clobetasol 0.05 % External Ointment 45 g 0     Sig: Apply to the affected area on the Vulva nightly for 4 wks       There is no refill protocol information for this order              Recent Outpatient Visits              1 month ago Retrocalcaneal bursitis (back of heel), right    [facility], [address] [place] [address] Sangita Wilson, ROSEMARY    Office Visit    9 months ago Encounter for general adult medical examination with abnormal findings    [facility], [address] [place], [place] [address]Nirali Smith,     Office Visit    10 months ago Encounter for medical examination to establish care    [address] Nirali Jeffers,     Office Visit

## 2024-07-25 RX ORDER — CLOBETASOL PROPIONATE 0.5 MG/G
OINTMENT TOPICAL
Qty: 45 G | Refills: 0 | Status: SHIPPED | OUTPATIENT
Start: 2024-07-25

## 2024-08-15 ENCOUNTER — TELEPHONE (OUTPATIENT)
Dept: PHYSICAL THERAPY | Facility: HOSPITAL | Age: 59
End: 2024-08-15

## 2024-08-19 NOTE — PROGRESS NOTES
LOWER EXTREMITY EVALUATION:     Diagnosis:   Retrocalcaneal bursitis (back of heel), right (M77.51)       Referring Provider: Sangita Wilson  Date of Evaluation:    8/20/2024    Precautions:  None Next MD visit:   none scheduled  Date of Surgery: n/a     PATIENT SUMMARY   Louann Castillo is a 59 year old female who presents to therapy today with complaints of (R) lateral heel pain that started last September and got worse overtime. One night it work her up and the \"pain was unbelievable and almost went to the ER\" but she went to urgent care the next day and was referred to a foot specialist. She was given a boot and used it for a few days and it did help. She is not taking any pain medicine. She is icing as needed. She had a cortisone shot 2 months ago and does not see a benefit from it. The pain is described as \"electrical burning\" at lateral heel. Stair descending is one of the most limited and painful activities.       Pt describes pain level current 3/10, at best 1-2/10, at worst 4-5/10.   Current functional limitations include stair negotiation, walking >1.5 miles, prolonged standing, and floor to stand transfers.     Louann describes prior level of function not restricted to daily walks with her dogs. Pt goals include return to walking pain free.    Past medical history was reviewed with Louann. Significant findings include  has a past medical history of Allergic rhinitis (1981), Anxiety, Arthritis (2021), Asthma (HCC), Bipolar 2 disorder (HCC), Calculus of kidney (1987), Depression (1975), Esophageal reflux (1995), Hyperlipidemia, Kidney stones, and Seasonal allergies., (L) hip labrum repair, BL knee arthritis, high cholesterol, Hand arthritis.       X-ray of (R) foot on 5/25/24: \"FINDINGS:  Scattered mild peripheral arthritis.  No fracture, expansile lesion or erosion.  Moderate-sized corticated plantar heel spur.  No adjacent soft tissue calcifications.  Small enthesophyte at the insertion of the Achilles  tendon on the   calcaneus. \"    (R) ankle on 5/245/24: \"Joint spaces are normal.  No fracture, expansile lesion or erosion.  Moderate-sized corticated plantar heel spur.  No adjacent soft tissue calcifications.  Small enthesophyte at the insertion of the Achilles tendon on calcaneus. \"    ASSESSMENT  Louann presents to physical therapy evaluation with primary c/o (R) lateral heel pain and edema. The results of the objective tests and measures show impairments in soft tissue restrictions, A/PROM of ankle DF, TC joint mobility, glut weakness, muscles and balance impairments between L and R LE. Pt's presents with greater deficits on contralateral side, therefore, her(R) achilles pain may be due to overcompensation. Functional deficits include but are not limited to stair negotiation, walking >1.5 miles, prolonged standing, and floor to stand transfers.  Signs and symptoms are consistent with diagnosis of (R) Retrocalcaneal bursitis. Pt and PT discussed evaluation findings, pathology, POC and HEP.  Pt voiced understanding and performs HEP correctly without reported pain. Skilled Physical Therapy is medically necessary to address the above impairments and reach functional goals.     OBJECTIVE:   Observation: Pes planus and ankle pronation L>R, increased edema at medial and lateral aspects of achilles tendon  Palpation: TTP at lateral achilles insertion and lateral retro calcaneal bursa  Sensation: WNL    AROM: (* denotes performed with pain)  Hip Knee Foot/Ankle   WNL's WNL's    DF: R 0; L 0  PF: R WNL*; L WNL  INV: R WNL; L WNL  EV: R Mild restriction; L WNL  Great toe ext: R 45 deg; L 45 deg     Accessory motion: Moderate hypomobility of (R) TC posterior glide    Flexibility:  Gastroc-soleus: R +; L +    Strength/MMT: (* denotes performed with pain)  Hip Knee Foot/Ankle   Flexion: R 4-/5; L 3+/5  Extension: R 3+/5; L 3+/5  Abduction: R 3+/5; L 3+/5  ER: R 4-/5; L 4-/5   Flexion: R 5/5; L 5/5  Extension: R 5/5; L 4/5     DF: R 4/5; L 4/5  PF: R 4/5*; L 4/5  INV: R 4/5; L 4/5  EV: R 4-/5; L 4+/5  Great toe ext: R 4/5; L 4/5  Great toe Flex: R 4/5; L 4/5       Gait: pt ambulates on level ground with normal mechanics    Balance:   SLS: R 21 sec, L 7 sec  Tandem: R 30 sec,  L 26 sec    Today’s Treatment and Response:   Pt education was provided on exam findings, treatment diagnosis, treatment plan, expectations, and prognosis. Pt was also provided recommendations for activity modifications, possible soreness after evaluation, modalities as needed [ice/heat], and importance of remaining active.  Patient was instructed in and issued a HEP for:     Access Code: [email or web address]   URL: [email or web address]  Date: 08/20/2024  Prepared by: Nohemi Carrasquillo    Exercises (10 min)  - Gastroc Stretch on Step  - 3 x daily - 7 x weekly - 3 sets - 20-30 seconds hold  - Standing Soleus Stretch on Step  - 3 x daily - 7 x weekly - 3 sets - 20-30 seconds hold  - Sidelying Hip Abduction  - 3 x daily - 5 x weekly - 3 sets - 10 reps    Manual Tech: STM to (R) gastroc and IASTM to achilles. PROM ankle in PARI/IN and DF to tolerance. (10 min)     Charges: PT Eval Low Complexity, Therex: 1 unit, Manual tech: 1 unit      Total Timed Treatment: 20 min     Total Treatment Time: 45 min     Based on clinical rationale and outcome measures, this evaluation involved Low Complexity .   PLAN OF CARE:    Goals: (to be met in 10 visits)  Pt will demonstrate improved DF AROM to >= 8 degrees to promote proper foot clearance during gait and greater ease descending stairs without compensation  Pt will have increased ankle strength to 5/5 throughout for improved ankle control with ADLs such as prolonged gait and stair negotiation (  Pt will have improved SLS to >15s on airex for increased ankle stability with ambulation on uneven surfaces such as gravel and grass   Pt will report <2/10 pain with work and home activities such as taking her dog for a 1.5 mile walk.     Pt will be independent and compliant with comprehensive HEP to maintain progress achieved in PT     Frequency / Duration: Patient will be seen for 1-2 x/week or a total of 10 visits over a 90 day period. Treatment will include: Manual Therapy, Neuromuscular Re-education, Therapeutic Activities, Therapeutic Exercise, Home Exercise Program instruction, and Modalities to include: Ultrasound and trigger point dry needling prn    Education or treatment limitation: None  Rehab Potential:good    LEFS Score  LEFS Score: 61.25 % (8/19/2024 12:12 PM)      Patient/Family/Caregiver was advised of these findings, precautions, and treatment options and has agreed to actively participate in planning and for this course of care.    Thank you for your referral. Please co-sign or sign and return this letter via fax as soon as possible to [phone]. If you have any questions, please contact me at Dept: [phone]    Sincerely,  Electronically signed by therapist: Nohemi Carrasquillo, PT  Physician's certification required: Yes  I certify the need for these services furnished under this plan of treatment and while under my care.    X___________________________________________________ Date____________________    Certification From: 8/19/2024  To:11/17/2024

## 2024-08-20 ENCOUNTER — OFFICE VISIT (OUTPATIENT)
Dept: PHYSICAL THERAPY | Age: 59
End: 2024-08-20
Attending: PODIATRIST
Payer: COMMERCIAL

## 2024-08-20 DIAGNOSIS — M77.51 RETROCALCANEAL BURSITIS (BACK OF HEEL), RIGHT: Primary | ICD-10-CM

## 2024-08-20 PROCEDURE — 97140 MANUAL THERAPY 1/> REGIONS: CPT | Performed by: PHYSICAL THERAPIST

## 2024-08-20 PROCEDURE — 97110 THERAPEUTIC EXERCISES: CPT | Performed by: PHYSICAL THERAPIST

## 2024-08-20 PROCEDURE — 97161 PT EVAL LOW COMPLEX 20 MIN: CPT | Performed by: PHYSICAL THERAPIST

## 2024-08-25 DIAGNOSIS — E78.2 MIXED HYPERLIPIDEMIA: ICD-10-CM

## 2024-08-25 RX ORDER — SIMVASTATIN 20 MG
20 TABLET ORAL NIGHTLY
Qty: 90 TABLET | Refills: 3 | Status: SHIPPED | OUTPATIENT
Start: 2024-08-25

## 2024-08-25 NOTE — TELEPHONE ENCOUNTER
Refill passed per [facility] protocol.    Requested Prescriptions   Pending Prescriptions Disp Refills    SIMVASTATIN 20 MG Oral Tab [Pharmacy Med Name: SIMVASTATIN 20MG TABLETS] 90 tablet 0     Sig: TAKE 1 TABLET(20 MG) BY MOUTH EVERY NIGHT       Cholesterol Medication Protocol Passed - 8/25/2024  5:49 AM        Passed - ALT < 80     Lab Results   Component Value Date    ALT 51 09/11/2023             Passed - ALT resulted within past year        Passed - Lipid panel within past 12 months     Lab Results   Component Value Date    CHOLEST 296 (H) 09/11/2023    TRIG 382 (H) 09/11/2023    HDL 45 09/11/2023     (H) 09/11/2023    VLDL 80 (H) 09/11/2023    NONHDLC 251 (H) 09/11/2023             Passed - In person appointment or virtual visit in the past 12 mos or appointment in next 3 mos     Recent Outpatient Visits              5 days ago Retrocalcaneal bursitis (back of heel), right    [facility] [place]    Office Visit    2 months ago Retrocalcaneal bursitis (back of heel), right    [facility], [address] [place] [address] Sangita Wilson DPM    Office Visit    10 months ago Encounter for general adult medical examination with abnormal findings    [address] Nirali Jeffers DO    Office Visit    11 months ago Encounter for medical examination to establish care    [address] Nirali Jeffers,     Office Visit          Future Appointments         Provider Department Appt Notes    In 2 days Nohemi Carrasquillo, PT [facility] [place] 5v 8/15 to 11/15  [employer] hMO  no c/p, 60v    In 1 week Nohemi Carrasquillo, PT [facility] [place] 5v 8/15 to 11/15  [employer] hMO  no c/p, 60v    In 3 weeks Nohemi Carrasquillo, PT [facility] [place] 5v 8/15 to 11/15  [employer] hMO  no c/p, 60v    In 1 month Nohemi Carrasquillo, PT [facility] [place]  Last Auth Visit  5v 8/15 to 11/15  [employer] hMO  no c/p, 60v    In 1 month Nohemi Carrasquillo, PT [facility] [place] Auth??  5v 8/15 to 11/15  [employer]BS hMO  no c/p, 60v    In 1 month Nohemi Carrasquillo, PT [facility] [place] Auth??  5v 8/15 to 11/15  [employer] hMO  no c/p, 60v    In 1 month Nohemi Carrasquillo, PT [facility] [place] Auth??  5v 8/15 to 11/15  [employer] hMO  no c/p, 60v    In 1 month Nohemi Carrasquillo, PT [facility] [place] Auth??  5v 8/15 to 11/15  [employer] hMO  no c/p, 60v    In 2 months Nohemi Carrasquillo, PT [facility] [place] Auth??  5v 8/15 to 11/15  [employer]BS hMO  no c/p, 60v                         Future Appointments         Provider Department Appt Notes    In 2 days Nohemi Carrasquillo, PT [facility] [place] 5v 8/15 to 11/15  [employer] hMO  no c/p, 60v    In 1 week Nohemi Carrasquillo, PT [facility] [place] 5v 8/15 to 11/15  [employer] hMO  no c/p, 60v    In 3 weeks Nohemi Carrasquillo, PT [facility] [place] 5v 8/15 to 11/15  [employer] hMO  no c/p, 60v    In 1 month Nohemi Carrasquillo, PT [facility] [place] Last Auth Visit  5v 8/15 to 11/15  [employer] hMO  no c/p, 60v    In 1 month Nohemi Carrasquillo, PT [facility] [place] Auth??  5v 8/15 to 11/15  [employer]BS hMO  no c/p, 60v    In 1 month Nohemi Carrasquillo, PT [facility] [place] Auth??  5v 8/15 to 11/15  [employer]BS hMO  no c/p, 60v    In 1 month Nohemi Carrasquillo, PT [facility] [place] Auth??  5v 8/15 to 11/15  BCBS hMO  no c/p, 60v    In 1 month Nohemi Carrasquillo, PT [facility] [place] Auth??  5v 8/15 to 11/15  BCBS hMO  no c/p, 60v    In 2 months Nohemi Carrasquillo, PT [facility] [place] Auth??  5v 8/15 to 11/15  [employer]BS hMO  no c/p, 60v            Recent Outpatient Visits              5 days ago Retrocalcaneal bursitis (back of heel), right    [facility] [place] Nohemi Carrasquillo, PT    Office Visit    2  months ago Retrocalcaneal bursitis (back of heel), right    [facility], [address] [place] [address] Sangita Wilson, ROSEMARY    Office Visit    10 months ago Encounter for general adult medical examination with abnormal findings    [facility], [address] [place] [address] Nirali Jeffers,     Office Visit    11 months ago Encounter for medical examination to establish care    [address] Nirali Jeffers,     Office Visit

## 2024-08-27 ENCOUNTER — OFFICE VISIT (OUTPATIENT)
Dept: PHYSICAL THERAPY | Age: 59
End: 2024-08-27
Attending: PODIATRIST
Payer: COMMERCIAL

## 2024-08-27 PROCEDURE — 97140 MANUAL THERAPY 1/> REGIONS: CPT | Performed by: PHYSICAL THERAPIST

## 2024-08-27 PROCEDURE — 97110 THERAPEUTIC EXERCISES: CPT | Performed by: PHYSICAL THERAPIST

## 2024-08-27 NOTE — PROGRESS NOTES
Diagnosis:   Retrocalcaneal bursitis (back of heel), right (M77.51)          Referring Provider: Sangita Wilson  Date of Evaluation:    8/20/24    Precautions:  None Next MD visit:   none scheduled  Date of Surgery: n/a   Insurance Primary/Secondary: [place] / N/A     # Auth Visits: 5 visits till 11/15/24            Subjective: Pt states she has been less fearful of going up and down the stairs and the exercises are working out good. She stood for a prolonged time at a concert this weekend and the pain did not get too bad.     Pain: 2/10 currently, reduced pain after manual tech      Objective:     AROM: (* denotes performed with pain)  Foot/Ankle   DF: R 0; L 0  PF: R WNL*; L WNL  INV: R WNL; L WNL  EV: R Mild restriction; L WNL  Great toe ext: R 45 deg; L 45 deg          Assessment: Pt presents with no major changes in ankle/gastroc flexibility. Performed STM to gastroc and achilles to promote improvements in ankle mobility. She ties out a heel lift in her shoe and reports an immediate reduction in pain and was recommended to purchase a heel lift. Continued to progress hip and ankle strengthening with good tolerance.       Goals:   (to be met in 10 visits)  Pt will demonstrate improved DF AROM to >= 8 degrees to promote proper foot clearance during gait and greater ease descending stairs without compensation  Pt will have increased ankle strength to 5/5 throughout for improved ankle control with ADLs such as prolonged gait and stair negotiation (  Pt will have improved SLS to >15s on airex for increased ankle stability with ambulation on uneven surfaces such as gravel and grass   Pt will report <2/10 pain with work and home activities such as taking her dog for a 1.5 mile walk.    Pt will be independent and compliant with comprehensive HEP to maintain progress achieved in PT     Plan: Progress with achilles gradual loading/stretching  Date: 8/27/2024  TX#: 2/5 approved Date:                 TX#: 3/ Date:                  TX#: 4/ Date:                 TX#: 5/ Date:   Tx#: 6/   Manual tech: 15 min  -STM to (R) gastroc and achilles  -sub talar mobs med/lat, level III  -PROM (R) ankle       TherEx: 30 min  -NuStep: 7 min  -testing a heel lift  -patient education  -s/l hip abd: x20 eahc side  -bridges: 2x15  -seated ankle DF/PF: x20  -seated heel slide: 2x10   -arch lifting: x20, 5 sec                       HEP:   Access Code: [email or web address]  URL: [email or web address]  Date: 08/27/2024  Prepared by: Nohemi Carrasquillo    Exercises  - Gastroc Stretch on Step  - 3 x daily - 7 x weekly - 3 sets - 20-30 seconds hold  - Standing Soleus Stretch on Step  - 3 x daily - 7 x weekly - 3 sets - 20-30 seconds hold  - Sidelying Hip Abduction  - 3 x daily - 5 x weekly - 3 sets - 10 reps  - Beginner Bridge  - 1 x daily - 5 x weekly - 3 sets - 10 reps  - Seated Heel Toe Raises  - 1 x daily - 5 x weekly - 3 sets - 10 reps  - Seated Heel Slide  - 1 x daily - 5 x weekly - 3 sets - 10 reps  - Seated Arch Lifts  - 1 x daily - 5 x weekly - 3 sets - 10 reps    Charges: TherEx: 2 units, Manual tech: 1 unit         Total Timed Treatment: 45 min  Total Treatment Time: 45 min

## 2024-09-03 NOTE — PROGRESS NOTES
Diagnosis:   Retrocalcaneal bursitis (back of heel), right (M77.51)          Referring Provider: Sangita Wilson  Date of Evaluation:    8/20/24    Precautions:  None Next MD visit:   none scheduled  Date of Surgery: n/a   Insurance Primary/Secondary: [place] / N/A     # Auth Visits: 5 visits till 11/15/24            Subjective: Pt states she feels the pain is a little higher up her leg today. States she likes the heel lift and they do help quite a bit. She states she was able to go up the stairs w/o holding on to the railing due to feeling better at her knee and ankle/achilles.     Pain: 1/10 currently, 3/10 walking this morning       Objective:     AROM: (* denotes performed with pain)  Foot/Ankle   DF: R 0 deg pre tx           R 3 deg post tx             Assessment: Pt is reporting functional improvements in ability to climb stairs w/o use of UE for support. She is progressed to weight bearing intrinsic and calf strengthening on leg press to promote gradual loading of the achilles tendon. She tolerates tx well.       Goals:   (to be met in 10 visits)  Pt will demonstrate improved DF AROM to >= 8 degrees to promote proper foot clearance during gait and greater ease descending stairs without compensation  Pt will have increased ankle strength to 5/5 throughout for improved ankle control with ADLs such as prolonged gait and stair negotiation (  Pt will have improved SLS to >15s on airex for increased ankle stability with ambulation on uneven surfaces such as gravel and grass   Pt will report <2/10 pain with work and home activities such as taking her dog for a 1.5 mile walk.    Pt will be independent and compliant with comprehensive HEP to maintain progress achieved in PT     Plan: Progress with achilles gradual loading/stretching  Date: 8/27/2024  TX#: 2/5 approved Date: 9/4/24               TX#: 3/5 approved Date:                 TX#: 4/ Date:                 TX#: 5/ Date:   Tx#: 6/   Manual tech: 15  min  -STM to (R) gastroc and achilles  -sub talar mobs med/lat, level III  -PROM (R) ankle Manual tech: 15 min  -STM to (R) gastroc and achilles  -sub talar mobs med/lat, level III        TherEx: 30 min  -NuStep: 7 min  -testing a heel lift  -patient education  -s/l hip abd: x20 eahc side  -bridges: 2x15  -seated ankle DF/PF: x20  -seated heel slide: 2x10   -arch lifting: x20, 5 sec   TherEx: 30 min  -NuStep: 6 min  -PROM (R) ankle  -seated eccentric calf stretch w/ Gray TB: 2x10 (slow DF)  -standing arch lifting: x20, 5 sec  -DL calf press: 50#, 2x10  -tandem balance: 2x30 sec each way                    HEP:   Access Code: [email or web address]  URL: [email or web address]  Date: 08/27/2024  Prepared by: Nohemi Carrasquillo    Exercises  - Gastroc Stretch on Step  - 3 x daily - 7 x weekly - 3 sets - 20-30 seconds hold  - Standing Soleus Stretch on Step  - 3 x daily - 7 x weekly - 3 sets - 20-30 seconds hold  - Sidelying Hip Abduction  - 3 x daily - 5 x weekly - 3 sets - 10 reps  - Beginner Bridge  - 1 x daily - 5 x weekly - 3 sets - 10 reps  - Seated Heel Toe Raises  - 1 x daily - 5 x weekly - 3 sets - 10 reps  - Seated Heel Slide  - 1 x daily - 5 x weekly - 3 sets - 10 reps  - Seated Arch Lifts  - 1 x daily - 5 x weekly - 3 sets - 10 reps    Charges: TherEx: 2 units, Manual tech: 1 unit         Total Timed Treatment: 45 min  Total Treatment Time: 45 min

## 2024-09-04 ENCOUNTER — OFFICE VISIT (OUTPATIENT)
Dept: PHYSICAL THERAPY | Age: 59
End: 2024-09-04
Attending: PODIATRIST
Payer: COMMERCIAL

## 2024-09-04 PROCEDURE — 97110 THERAPEUTIC EXERCISES: CPT | Performed by: PHYSICAL THERAPIST

## 2024-09-04 PROCEDURE — 97140 MANUAL THERAPY 1/> REGIONS: CPT | Performed by: PHYSICAL THERAPIST

## 2024-09-17 ENCOUNTER — OFFICE VISIT (OUTPATIENT)
Dept: PHYSICAL THERAPY | Age: 59
End: 2024-09-17
Attending: PODIATRIST
Payer: COMMERCIAL

## 2024-09-17 PROCEDURE — 97110 THERAPEUTIC EXERCISES: CPT | Performed by: PHYSICAL THERAPIST

## 2024-09-17 PROCEDURE — 97140 MANUAL THERAPY 1/> REGIONS: CPT | Performed by: PHYSICAL THERAPIST

## 2024-09-17 PROCEDURE — 97112 NEUROMUSCULAR REEDUCATION: CPT | Performed by: PHYSICAL THERAPIST

## 2024-09-17 NOTE — PROGRESS NOTES
Diagnosis:   Retrocalcaneal bursitis (back of heel), right (M77.51)          Referring Provider: Sangita Wilson  Date of Evaluation:    8/20/24    Precautions:  None Next MD visit:   none scheduled  Date of Surgery: n/a   Insurance Primary/Secondary: [place] / N/A     # Auth Visits: 5 visits till 11/15/24            Subjective: Pt states she performed repetitive stair negotiation to do a few load of laundry after her vacation and the heel pain did increase but in spurts through out the day. Today it is better.      Pain: 1-2/10 currently, 3-4/10 yesterday      Objective:     AROM: (* denotes performed with pain)  Foot/Ankle   PF: R 0 deg post tx             Assessment: Pt is educated don risk and benefits of trigger point dry needling and provides verbal consent to tx. She responds well to manual tech and reports relief post tx. She is progressed with balance exercises with good tolerance.        Goals:   (to be met in 10 visits)  Pt will demonstrate improved DF AROM to >= 8 degrees to promote proper foot clearance during gait and greater ease descending stairs without compensation  Pt will have increased ankle strength to 5/5 throughout for improved ankle control with ADLs such as prolonged gait and stair negotiation (  Pt will have improved SLS to >15s on airex for increased ankle stability with ambulation on uneven surfaces such as gravel and grass   Pt will report <2/10 pain with work and home activities such as taking her dog for a 1.5 mile walk.    Pt will be independent and compliant with comprehensive HEP to maintain progress achieved in PT     Plan: Progress with achilles gradual loading/stretching  Date: 8/27/2024  TX#: 2/5 approved Date: 9/4/24               TX#: 3/5 approved Date: 9/17/24                TX#: 4/5 Date:                 TX#: 5/ Date:   Tx#: 6/   Manual tech: 15 min  -STM to (R) gastroc and achilles  -sub talar mobs med/lat, level III  -PROM (R) ankle Manual tech: 15 min  -STM to (R)  gastroc and achilles  -sub talar mobs med/lat, level III   Manual tech: 25 min  -STM to (R) gastroc and achilles  -sub talar mobs med/lat, level III     TherEx: 30 min  -NuStep: 7 min  -testing a heel lift  -patient education  -s/l hip abd: x20 eahc side  -bridges: 2x15  -seated ankle DF/PF: x20  -seated heel slide: 2x10   -arch lifting: x20, 5 sec   TherEx: 30 min  -NuStep: 6 min  -PROM (R) ankle  -seated eccentric calf stretch w/ Gray TB: 2x10 (slow DF)  -standing arch lifting: x20, 5 sec  -DL calf press: 50#, 2x10  -tandem balance: 2x30 sec each way TherEx: 15 min  -NuStep: 6 min  -PROM (R) ankle  -pro stretch: 3x30 sec  -CKC ankle DF knee taps to wall: 2x10  -DL calf press: 50#, 2x10         Neuro re-ed: 13 min  -anterior/post weight shifts: x20  -standing arch lifting standing on airex: x20, 5 sec  -half tandem on airex: 2x30 sec each  -Marching in standing (w/ arch lift): x15 each side              HEP:   Access Code: [email or web address]  URL: [email or web address]  Date: 08/27/2024  Prepared by: Nohemi Carrasquillo    Exercises  - Gastroc Stretch on Step  - 3 x daily - 7 x weekly - 3 sets - 20-30 seconds hold  - Standing Soleus Stretch on Step  - 3 x daily - 7 x weekly - 3 sets - 20-30 seconds hold  - Sidelying Hip Abduction  - 3 x daily - 5 x weekly - 3 sets - 10 reps  - Beginner Bridge  - 1 x daily - 5 x weekly - 3 sets - 10 reps  - Seated Heel Toe Raises  - 1 x daily - 5 x weekly - 3 sets - 10 reps  - Seated Heel Slide  - 1 x daily - 5 x weekly - 3 sets - 10 reps  - Seated Arch Lifts  - 1 x daily - 5 x weekly - 3 sets - 10 reps    Charges: TherEx: 1 units, Manual tech: 2 unit, neuro re-ed: 1 unit        Total Timed Treatment: 53 min  Total Treatment Time: 53 min

## 2024-09-21 DIAGNOSIS — J30.89 NON-SEASONAL ALLERGIC RHINITIS, UNSPECIFIED TRIGGER: ICD-10-CM

## 2024-09-24 ENCOUNTER — OFFICE VISIT (OUTPATIENT)
Dept: PHYSICAL THERAPY | Age: 59
End: 2024-09-24
Attending: PODIATRIST
Payer: COMMERCIAL

## 2024-09-24 PROCEDURE — 97110 THERAPEUTIC EXERCISES: CPT | Performed by: PHYSICAL THERAPIST

## 2024-09-24 PROCEDURE — 97140 MANUAL THERAPY 1/> REGIONS: CPT | Performed by: PHYSICAL THERAPIST

## 2024-09-24 PROCEDURE — 97112 NEUROMUSCULAR REEDUCATION: CPT | Performed by: PHYSICAL THERAPIST

## 2024-09-24 NOTE — PROGRESS NOTES
Diagnosis:   Retrocalcaneal bursitis (back of heel), right (M77.51)          Referring Provider: Sangita Wilson  Date of Evaluation:    8/20/24    Precautions:  None Next MD visit:   none scheduled  Date of Surgery: n/a   Insurance Primary/Secondary: [place] / N/A     # Auth Visits: 5 visits till 11/15/24            Subjective: Pt states heel pain was OK day after last PT session. But still she feels occasionally sharp pain in the heel and happens at random and when going down the stairs.     Pain: 3/10 currently      Objective:     AROM: (* denotes performed with pain)  Foot/Ankle   PF: R +3 deg post tx             Assessment: Pt is progressed to standing heel raises and stepping exercises to improve tolerance to stair negotiation. She reports some increase I pain with step down but does not linger. Overall pt states her pain reduced from start to end of tx session.     Goals:   (to be met in 10 visits)  Pt will demonstrate improved DF AROM to >= 8 degrees to promote proper foot clearance during gait and greater ease descending stairs without compensation  Pt will have increased ankle strength to 5/5 throughout for improved ankle control with ADLs such as prolonged gait and stair negotiation (  Pt will have improved SLS to >15s on airex for increased ankle stability with ambulation on uneven surfaces such as gravel and grass   Pt will report <2/10 pain with work and home activities such as taking her dog for a 1.5 mile walk.    Pt will be independent and compliant with comprehensive HEP to maintain progress achieved in PT     Plan: Progress with achilles gradual loading/stretching  Date: 8/27/2024  TX#: 2/5 approved Date: 9/4/24               TX#: 3/5 approved Date: 9/17/24                TX#: 4/5 Date:9/24/24                 TX#: 5/5 Date:   Tx#: 6/   Manual tech: 15 min  -STM to (R) gastroc and achilles  -sub talar mobs med/lat, level III  -PROM (R) ankle Manual tech: 15 min  -STM to (R) gastroc and  achilles  -sub talar mobs med/lat, level III   Manual tech: 25 min  -STM to (R) gastroc and achilles  -sub talar mobs med/lat, level III Manual tech: 15 min  -STM/IASTM to (R) gastroc and achilles  -sub talar mobs med/lat, level III  -Trigger point dry needling to (R) medial/lateral gastroc ([id number])    TherEx: 30 min  -NuStep: 7 min  -testing a heel lift  -patient education  -s/l hip abd: x20 eahc side  -bridges: 2x15  -seated ankle DF/PF: x20  -seated heel slide: 2x10   -arch lifting: x20, 5 sec   TherEx: 30 min  -NuStep: 6 min  -PROM (R) ankle  -seated eccentric calf stretch w/ Gray TB: 2x10 (slow DF)  -standing arch lifting: x20, 5 sec  -DL calf press: 50#, 2x10  -tandem balance: 2x30 sec each way TherEx: 15 min  -NuStep: 6 min  -PROM (R) ankle  -pro stretch: 3x30 sec  -CKC ankle DF knee taps to wall: 2x10  -DL calf press: 50#, 2x10   TherEx: 20 min  -NuStep: 6 min  -PROM (R) ankle  -Calf stretch on step and soleus stretch: 3x30 sec  -DL heel raises: 2x10  -DL soleus raises: 2x10  -step up: 6\" step 2x10  -4\" step down: 2x10  -SL calf press: 37#, 2x10        Neuro re-ed: 13 min  -anterior/post weight shifts: x20  -standing arch lifting standing on airex: x20, 5 sec  -half tandem on airex: 2x30 sec each  -Marching in standing (w/ arch lift): x15 each side   Neuro re-ed: 10 min  -airex balance beam lateral walk: x4 cycles  -Wobble board taps A/P: x30 each side  -Marching in standing (w/ arch lift): x15 each side  -tandem: 2x30 sec           HEP:   Access Code: [email or web address]  URL: [email or web address]  Date: 09/24/2024  Prepared by: Nohemi Carrasquillo    Exercises  - Gastroc Stretch on Step  - 3 x daily - 7 x weekly - 3 sets - 20-30 seconds hold  - Standing Soleus Stretch on Step  - 3 x daily - 7 x weekly - 3 sets - 20-30 seconds hold  - Sidelying Hip Abduction  - 3 x daily - 5 x weekly - 3 sets - 10 reps  - Beginner Bridge  - 1 x daily - 5 x weekly - 3 sets - 10 reps  - Standing Heel Raises  - 1 x  daily - 4 x weekly - 3 sets - 10 reps  - Standing Marching  - 1 x daily - 5 x weekly - 3 sets - 10 reps  - Tandem Stance  - 1 x daily - 5 x weekly - 3 sets - 10 reps    Charges: TherEx: 1 units, Manual tech: 1 unit, neuro re-ed: 1 unit        Total Timed Treatment: 45 min  Total Treatment Time: 45 min

## 2024-09-26 DIAGNOSIS — J30.89 NON-SEASONAL ALLERGIC RHINITIS, UNSPECIFIED TRIGGER: ICD-10-CM

## 2024-09-26 RX ORDER — MONTELUKAST SODIUM 10 MG/1
10 TABLET ORAL DAILY
Qty: 30 TABLET | Refills: 0 | Status: SHIPPED | OUTPATIENT
Start: 2024-09-26 | End: 2024-10-24

## 2024-09-27 RX ORDER — MONTELUKAST SODIUM 10 MG/1
10 TABLET ORAL DAILY
Qty: 90 TABLET | Refills: 3 | OUTPATIENT
Start: 2024-09-27

## 2024-09-27 NOTE — TELEPHONE ENCOUNTER
Scheduled    Future Appointments   Date Time Provider Department Center   10/1/2024  9:45 AM Nohemi Carrasquillo, PT [facility]   10/8/2024  9:45 AM Nohemi Carrasquillo, PT [facility]   10/15/2024  9:45 AM Nohemi Carrasquillo, PT [facility]   10/22/2024  9:45 AM Nohemi Carrasquillo, PT [facility]   10/24/2024 10:00 AM Nirali Jeffers, DO [facility]   10/29/2024  9:45 AM Nohemi Carrasquillo, PT [facility]

## 2024-09-30 NOTE — PROGRESS NOTES
Diagnosis:   Retrocalcaneal bursitis (back of heel), right (M77.51)          Referring Provider: Sangita Wilson  Date of Evaluation:    8/20/24    Precautions:  None Next MD visit:   none scheduled  Date of Surgery: n/a   Insurance Primary/Secondary: [place] / N/A     # Auth Visits: 10 visits till 12/31/24            Subjective: Pt states pain is on/off. This weekend she traveled and did a lot a [place] the airports and walking [place] grassy terrain and gravel at an outdoor wedding. Every once in a while she would get a pain that \"feels electrical and burning\"     Pain: 0/10 currently      Objective:     AROM: (* denotes performed with pain)  Foot/Ankle   PF: R +3 deg post tx             Assessment: Pt is reporting improving tolerance to walking and ambulating over uneven surfaces with reduced instances of pain. Due to pt reporting \"electrical pain and burning in the heel\" decided to perform lumbar p/a's to r/o lumbar involvement. Her lower lumbar segments were stiff and painful but did not reproduce heel pain. She responds well to STM and manual stretches.    Goals:   (to be met in 10 visits)  Pt will demonstrate improved DF AROM to >= 8 degrees to promote proper foot clearance during gait and greater ease descending stairs without compensation  Pt will have increased ankle strength to 5/5 throughout for improved ankle control with ADLs such as prolonged gait and stair negotiation (  Pt will have improved SLS to >15s on airex for increased ankle stability with ambulation on uneven surfaces such as gravel and grass   Pt will report <2/10 pain with work and home activities such as taking her dog for a 1.5 mile walk.    Pt will be independent and compliant with comprehensive HEP to maintain progress achieved in PT     Plan: Progress with achilles gradual loading/stretching  Date: 8/27/2024  TX#: 2/5 approved Date: 9/4/24               TX#: 3/5 approved Date: 9/17/24                TX#: 4/5 Date:9/24/24                  TX#: 5/5 Date:10/1/24   Tx#: 6/10   Manual tech: 15 min  -STM to (R) gastroc and achilles  -sub talar mobs med/lat, level III  -PROM (R) ankle Manual tech: 15 min  -STM to (R) gastroc and achilles  -sub talar mobs med/lat, level III   Manual tech: 25 min  -STM to (R) gastroc and achilles  -sub talar mobs med/lat, level III Manual tech: 15 min  -STM/IASTM to (R) gastroc and achilles  -sub talar mobs med/lat, level III  -Trigger point dry needling to (R) medial/lateral gastroc ([id number]) Manual tech: 15 min  -STM/IASTM to (R) gastroc and achilles  -sub talar mobs med/lat, level III  -Gastoc stretches w/ heel assist  -lumbar p/a's    TherEx: 30 min  -NuStep: 7 min  -testing a heel lift  -patient education  -s/l hip abd: x20 eahc side  -bridges: 2x15  -seated ankle DF/PF: x20  -seated heel slide: 2x10   -arch lifting: x20, 5 sec   TherEx: 30 min  -NuStep: 6 min  -PROM (R) ankle  -seated eccentric calf stretch w/ Gray TB: 2x10 (slow DF)  -standing arch lifting: x20, 5 sec  -DL calf press: 50#, 2x10  -tandem balance: 2x30 sec each way TherEx: 15 min  -NuStep: 6 min  -PROM (R) ankle  -pro stretch: 3x30 sec  -CKC ankle DF knee taps to wall: 2x10  -DL calf press: 50#, 2x10   TherEx: 20 min  -NuStep: 6 min  -PROM (R) ankle  -Calf stretch on step and soleus stretch: 3x30 sec  -DL heel raises: 2x10  -DL soleus raises: 2x10  -step up: 6\" step 2x10  -4\" step down: 2x10  -SL calf press: 37#, 2x10   TherEx: 20 min  -NuStep: 6 min  -PROM (R) ankle  -step up: 6\" step 2x10 each side  -lateral step up: x10 each side  -4\" step down: 2x10  -SL calf press: 37#, 2x10     Neuro re-ed: 13 min  -anterior/post weight shifts: x20  -standing arch lifting standing on airex: x20, 5 sec  -half tandem on airex: 2x30 sec each  -Marching in standing (w/ arch lift): x15 each side   Neuro re-ed: 10 min  -airex balance beam lateral walk: x4 cycles  -Wobble board taps A/P: x30 each side  -Marching in standing (w/ arch lift): x15 each  side  -tandem: 2x30 sec Neuro re-ed: 10 min  -Marching in standing (w/ arch lift): x15 each side  -blaze pods SLB 4 way taps on floor: 3x30 sec  -1/2 tandem stance on airex with hand blaze pod taps(4 pods): 2x30 sec each side          HEP:   Access Code: [email or web address]  URL: [email or web address]  Date: 09/24/2024  Prepared by: Nohemi Carrasquillo    Exercises  - Gastroc Stretch on Step  - 3 x daily - 7 x weekly - 3 sets - 20-30 seconds hold  - Standing Soleus Stretch on Step  - 3 x daily - 7 x weekly - 3 sets - 20-30 seconds hold  - Sidelying Hip Abduction  - 3 x daily - 5 x weekly - 3 sets - 10 reps  - Beginner Bridge  - 1 x daily - 5 x weekly - 3 sets - 10 reps  - Standing Heel Raises  - 1 x daily - 4 x weekly - 3 sets - 10 reps  - Standing Marching  - 1 x daily - 5 x weekly - 3 sets - 10 reps  - Tandem Stance  - 1 x daily - 5 x weekly - 3 sets - 10 reps    Charges: TherEx: 1 units, Manual tech: 1 unit, neuro re-ed: 1 unit        Total Timed Treatment: 45 min  Total Treatment Time: 45 min

## 2024-10-01 ENCOUNTER — OFFICE VISIT (OUTPATIENT)
Dept: PHYSICAL THERAPY | Age: 59
End: 2024-10-01
Attending: PODIATRIST
Payer: COMMERCIAL

## 2024-10-01 PROCEDURE — 97110 THERAPEUTIC EXERCISES: CPT | Performed by: PHYSICAL THERAPIST

## 2024-10-01 PROCEDURE — 97140 MANUAL THERAPY 1/> REGIONS: CPT | Performed by: PHYSICAL THERAPIST

## 2024-10-01 PROCEDURE — 97112 NEUROMUSCULAR REEDUCATION: CPT | Performed by: PHYSICAL THERAPIST

## 2024-10-04 NOTE — PROGRESS NOTES
Diagnosis:   Retrocalcaneal bursitis (back of heel), right (M77.51)          Referring Provider: Sangita Wilson  Date of Evaluation:    8/20/24    Precautions:  None Next MD visit:   none scheduled  Date of Surgery: n/a   Insurance Primary/Secondary: [place] / N/A     # Auth Visits: 10 visits till 12/31/24            Subjective: Pt states she is having less  frequent sharp and burning pains in the heel.     Pain: 0/10 currently      Objective:     AROM: (* denotes performed with pain)  Foot/Ankle   PF: R +5 deg post tx             Assessment: Pt progressed to SLB and marching on airex to promote ankle stability for ambulating uneven surfaces. She progresses in SL keny press with increased setts and able to perform SL standing heel raises w/o major increase in pain today. She tolerates tx well.       Goals:   (to be met in 10 visits)  Pt will demonstrate improved DF AROM to >= 8 degrees to promote proper foot clearance during gait and greater ease descending stairs without compensation  Pt will have increased ankle strength to 5/5 throughout for improved ankle control with ADLs such as prolonged gait and stair negotiation (  Pt will have improved SLS to >15s on airex for increased ankle stability with ambulation on uneven surfaces such as gravel and grass   Pt will report <2/10 pain with work and home activities such as taking her dog for a 1.5 mile walk.    Pt will be independent and compliant with comprehensive HEP to maintain progress achieved in PT     Plan: Progress with achilles gradual loading/stretching  Date: 8/27/2024  TX#: 2/5 approved Date: 9/4/24               TX#: 3/5 approved Date: 9/17/24                TX#: 4/5 Date:9/24/24                 TX#: 5/5 Date:10/1/24   Tx#: 6/10 Date:10/8/24   Tx#: 8/10   Manual tech: 15 min  -STM to (R) gastroc and achilles  -sub talar mobs med/lat, level III  -PROM (R) ankle Manual tech: 15 min  -STM to (R) gastroc and achilles  -sub talar mobs med/lat, level  III   Manual tech: 25 min  -STM to (R) gastroc and achilles  -sub talar mobs med/lat, level III Manual tech: 15 min  -STM/IASTM to (R) gastroc and achilles  -sub talar mobs med/lat, level III  -Trigger point dry needling to (R) medial/lateral gastroc ([id number]) Manual tech: 15 min  -STM/IASTM to (R) gastroc and achilles  -sub talar mobs med/lat, level III  -Gastoc stretches w/ heel assist  -lumbar p/a's  Manual tech: 15 min  -STM/IASTM to (R) gastroc and achilles  -sub talar mobs med/lat, level III  -Gastoc stretches w/ heel assist     TherEx: 30 min  -NuStep: 7 min  -testing a heel lift  -patient education  -s/l hip abd: x20 eahc side  -bridges: 2x15  -seated ankle DF/PF: x20  -seated heel slide: 2x10   -arch lifting: x20, 5 sec   TherEx: 30 min  -NuStep: 6 min  -PROM (R) ankle  -seated eccentric calf stretch w/ Gray TB: 2x10 (slow DF)  -standing arch lifting: x20, 5 sec  -DL calf press: 50#, 2x10  -tandem balance: 2x30 sec each way TherEx: 15 min  -NuStep: 6 min  -PROM (R) ankle  -pro stretch: 3x30 sec  -CKC ankle DF knee taps to wall: 2x10  -DL calf press: 50#, 2x10   TherEx: 20 min  -NuStep: 6 min  -PROM (R) ankle  -Calf stretch on step and soleus stretch: 3x30 sec  -DL heel raises: 2x10  -DL soleus raises: 2x10  -step up: 6\" step 2x10  -4\" step down: 2x10  -SL calf press: 37#, 2x10   TherEx: 20 min  -NuStep: 6 min  -PROM (R) ankle  -step up: 6\" step 2x10 each side  -lateral step up: x10 each side  -4\" step down: 2x10  -SL calf press: 37#, 2x10 TherEx: 10 min  -NuStep: 6 min  -PROM (R) ankle  -4\" step down: 2x10  -SL calf press: 37#, 3x15  -DL heel raise on 1/2 FR: 2x to fatigue  -calf stretch on step end of tx: 2x30 sec      Neuro re-ed: 13 min  -anterior/post weight shifts: x20  -standing arch lifting standing on airex: x20, 5 sec  -half tandem on airex: 2x30 sec each  -Marching in standing (w/ arch lift): x15 each side   Neuro re-ed: 10 min  -airex balance beam lateral walk: x4 cycles  -Wobble board taps  A/P: x30 each side  -Marching in standing (w/ arch lift): x15 each side  -tandem: 2x30 sec Neuro re-ed: 10 min  -Marching in standing (w/ arch lift): x15 each side  -blaze pods SLB 4 way taps on floor: 3x30 sec  -1/2 tandem stance on airex with hand blaze pod taps(4 pods): 2x30 sec each side Neuro re-ed: 20 min  -SLB 3x30 sec  -Marching in standing on airex: x15 each side  -blaze pods SLB 4 way taps on floor: 2x30 sec each leg  -1/2 tandem stance on airex with hand blaze pod taps(4 pods): 2x30 sec each side  -airex balance beam lateral walk: 3 cycles           HEP:   Access Code: [email or web address]  URL: [email or web address]  Date: 09/24/2024  Prepared by: Nohemi Carrasquillo    Exercises  - Gastroc Stretch on Step  - 3 x daily - 7 x weekly - 3 sets - 20-30 seconds hold  - Standing Soleus Stretch on Step  - 3 x daily - 7 x weekly - 3 sets - 20-30 seconds hold  - Sidelying Hip Abduction  - 3 x daily - 5 x weekly - 3 sets - 10 reps  - Beginner Bridge  - 1 x daily - 5 x weekly - 3 sets - 10 reps  - Standing Heel Raises  - 1 x daily - 4 x weekly - 3 sets - 10 reps  - Standing Marching  - 1 x daily - 5 x weekly - 3 sets - 10 reps  - Tandem Stance  - 1 x daily - 5 x weekly - 3 sets - 10 reps    Charges: TherEx: 1 units, Manual tech: 1 unit, neuro re-ed: 1 unit        Total Timed Treatment: 45 min  Total Treatment Time: 45 min

## 2024-10-08 ENCOUNTER — OFFICE VISIT (OUTPATIENT)
Dept: PHYSICAL THERAPY | Age: 59
End: 2024-10-08
Attending: PODIATRIST
Payer: COMMERCIAL

## 2024-10-08 PROCEDURE — 97112 NEUROMUSCULAR REEDUCATION: CPT | Performed by: PHYSICAL THERAPIST

## 2024-10-08 PROCEDURE — 97140 MANUAL THERAPY 1/> REGIONS: CPT | Performed by: PHYSICAL THERAPIST

## 2024-10-08 PROCEDURE — 97110 THERAPEUTIC EXERCISES: CPT | Performed by: PHYSICAL THERAPIST

## 2024-10-14 NOTE — PROGRESS NOTES
Diagnosis:   Retrocalcaneal bursitis (back of heel), right (M77.51)          Referring Provider: Sangtia Wilson  Date of Evaluation:    8/20/24    Precautions:  None Next MD visit:   none scheduled  Date of Surgery: n/a   Insurance Primary/Secondary: [place] / N/A     # Auth Visits: 10 visits till 12/31/24            Subjective: Pt states her heel hurts some on/off. Her knee pain has become more bothersome than her heels but this has been going on for a while due to arthritis.     Pain: 0/10 currently, 2-3/10 at worst      Objective:     AROM: (* denotes performed with pain)  Foot/Ankle   PF: R +5 deg post tx             Assessment: Pt is demonstrating improving single leg stability and is progressed to wobble board SL taps A/P direction to promote improving gastroc control.  She tolerates tx well but with reports of \"calf burning and fatigue\". She is given a calf stretch before she continues with balance and strengthening activities.        Goals:   (to be met in 10 visits)  Pt will demonstrate improved DF AROM to >= 8 degrees to promote proper foot clearance during gait and greater ease descending stairs without compensation  Pt will have increased ankle strength to 5/5 throughout for improved ankle control with ADLs such as prolonged gait and stair negotiation (  Pt will have improved SLS to >15s on airex for increased ankle stability with ambulation on uneven surfaces such as gravel and grass   Pt will report <2/10 pain with work and home activities such as taking her dog for a 1.5 mile walk.    Pt will be independent and compliant with comprehensive HEP to maintain progress achieved in PT     Plan: PN next tx   Date: 9/17/24                TX#: 4/5 Date:9/24/24                 TX#: 5/5 Date:10/1/24   Tx#: 6/10 Date:10/8/24   Tx#: 8/10 Date:10/15/24   Tx#: 9/10   Manual tech: 25 min  -STM to (R) gastroc and achilles  -sub talar mobs med/lat, level III Manual tech: 15 min  -STM/IASTM to (R) gastroc and  achilles  -sub talar mobs med/lat, level III  -Trigger point dry needling to (R) medial/lateral gastroc ([id number]) Manual tech: 15 min  -STM/IASTM to (R) gastroc and achilles  -sub talar mobs med/lat, level III  -Gastoc stretches w/ heel assist  -lumbar p/a's  Manual tech: 15 min  -STM/IASTM to (R) gastroc and achilles  -sub talar mobs med/lat, level III  -Gastoc stretches w/ heel assist   Manual tech: 15 min  -STM/IASTM to (R) gastroc and achilles  -sub talar mobs med/lat, level III  -Gastoc stretches w/ heel assist   TherEx: 15 min  -NuStep: 6 min  -PROM (R) ankle  -pro stretch: 3x30 sec  -CKC ankle DF knee taps to wall: 2x10  -DL calf press: 50#, 2x10   TherEx: 20 min  -NuStep: 6 min  -PROM (R) ankle  -Calf stretch on step and soleus stretch: 3x30 sec  -DL heel raises: 2x10  -DL soleus raises: 2x10  -step up: 6\" step 2x10  -4\" step down: 2x10  -SL calf press: 37#, 2x10   TherEx: 20 min  -NuStep: 6 min  -PROM (R) ankle  -step up: 6\" step 2x10 each side  -lateral step up: x10 each side  -4\" step down: 2x10  -SL calf press: 37#, 2x10 TherEx: 10 min  -NuStep: 6 min  -PROM (R) ankle  -4\" step down: 2x10  -SL calf press: 37#, 3x15  -DL heel raise on 1/2 FR: 2x to fatigue  -calf stretch on step end of tx: 2x30 sec  TherEx: 15 min  -NuStep: 6 min  -PROM (R) ankle  -SL calf press: 37#, 3x15  -DL heel raise on 1/2 FR: 2x to fatigue  -calf stretch on step end of tx: 2x30 sec      Neuro re-ed: 13 min  -anterior/post weight shifts: x20  -standing arch lifting standing on airex: x20, 5 sec  -half tandem on airex: 2x30 sec each  -Marching in standing (w/ arch lift): x15 each side   Neuro re-ed: 10 min  -airex balance beam lateral walk: x4 cycles  -Wobble board taps A/P: x30 each side  -Marching in standing (w/ arch lift): x15 each side  -tandem: 2x30 sec Neuro re-ed: 10 min  -Marching in standing (w/ arch lift): x15 each side  -blaze pods SLB 4 way taps on floor: 3x30 sec  -1/2 tandem stance on airex with hand blaze pod  taps(4 pods): 2x30 sec each side Neuro re-ed: 20 min  -SLB 3x30 sec  -Marching in standing on airex: x15 each side  -blaze pods SLB 4 way taps on floor: 2x30 sec each leg  -1/2 tandem stance on airex with hand blaze pod taps(4 pods): 2x30 sec each side  -airex balance beam lateral walk: 3 cycles Neuro re-ed: 15 min  -SLB 3x30 sec  -Marching in standing on airex: x20 each side  -blaze pods SLB 4 way taps on floor: 2x30 sec  -1/2 tandem stance on airex with hand blaze pod taps(4 pods): 2x30 sec each side  -airex balance beam lateral walk: 3 cycles  -SL wobble board taps A/P: 2x20          HEP:   Access Code: [email or web address]  URL: [email or web address]  Date: 09/24/2024  Prepared by: Nohemi Carrasquillo    Exercises  - Gastroc Stretch on Step  - 3 x daily - 7 x weekly - 3 sets - 20-30 seconds hold  - Standing Soleus Stretch on Step  - 3 x daily - 7 x weekly - 3 sets - 20-30 seconds hold  - Sidelying Hip Abduction  - 3 x daily - 5 x weekly - 3 sets - 10 reps  - Beginner Bridge  - 1 x daily - 5 x weekly - 3 sets - 10 reps  - Standing Heel Raises  - 1 x daily - 4 x weekly - 3 sets - 10 reps  - Standing Marching  - 1 x daily - 5 x weekly - 3 sets - 10 reps  - Tandem Stance  - 1 x daily - 5 x weekly - 3 sets - 10 reps    Charges: TherEx: 1 units, Manual tech: 1 unit, neuro re-ed: 1 unit        Total Timed Treatment: 45 min  Total Treatment Time: 45 min

## 2024-10-15 ENCOUNTER — OFFICE VISIT (OUTPATIENT)
Dept: PHYSICAL THERAPY | Age: 59
End: 2024-10-15
Attending: PODIATRIST
Payer: COMMERCIAL

## 2024-10-15 PROCEDURE — 97110 THERAPEUTIC EXERCISES: CPT | Performed by: PHYSICAL THERAPIST

## 2024-10-15 PROCEDURE — 97140 MANUAL THERAPY 1/> REGIONS: CPT | Performed by: PHYSICAL THERAPIST

## 2024-10-15 PROCEDURE — 97112 NEUROMUSCULAR REEDUCATION: CPT | Performed by: PHYSICAL THERAPIST

## 2024-10-21 NOTE — PROGRESS NOTES
Progress Summary  Pt has attended 10 visits in Physical Therapy.    Diagnosis:   Retrocalcaneal bursitis (back of heel), right (M77.51)          Referring Provider: Sangita Wilson  Date of Evaluation:    8/20/24    Precautions:  None Next MD visit:   none scheduled  Date of Surgery: n/a   Insurance Primary/Secondary: [employer] [place] / N/A     # Auth Visits: 10 visits till 12/31/24            Subjective: Pt states she is no longer feeling constant pain. Pain still comes and goes and the sharp burning pain occurs less often. Balance has improved. Improved tolerance to stairs, ambulating over uneven surfaces, and walking distances like grocery shopping.     Pain: 0/10 currently, 2-3/10 at worst      Objective:     Palpation: TTP at lateral achilles insertion and lateral retro calcaneal bursa--> improved      AROM: (* denotes performed with pain)  Foot/Ankle   DF: R 5; L 0  PF: R WNL*; L WNL  INV: R WNL; L WNL  EV: R WNL; L WNL           Flexibility:  Gastroc-soleus: R +; L +--> improved      Strength/MMT: (* denotes performed with pain)  Hip Knee Foot/Ankle   Flexion: R 4-/5; L 4-/5  Extension: R 3+/5; L 3+/5  Abduction: R 3+/5; L 3+/5  ER: R 4/5; L 4/5    Flexion: R 5/5; L 5/5  Extension: R 5/5; L 4/5    DF: R 4+/5; L 4+/5  PF: R 4+/5; L 4+/5  INV: R 4+/5; L 4+/5  EV: R 4+/5; L 4+/5           Gait: pt ambulates on level ground with normal mechanics     Balance:   SLS: R 21 sec, L 7 sec        Assessment: Pt has been gradually progressing with reports of reducing pain frequency and intensity. She has demonstrates improved ankle ROM, gastroc flexibility, LE strength, and balance. These objective gains have allowed pt to walk longer distances, hike trail over uneven surfaces, and repetitive stair negotiation. She continues to demonstrate impairments in mild gastroc restrictions, mild ankle weakness, moderate hip weakness, and moderate impairment in balance. These impairments limit her in ability to walk >1 mile to for  dog walking/extended grocery shopping, ambulating over uneven surfaces and descending stairs. She will continue to benefit from skilled PT to promote return to PLOF.       Goals:   (to be met in 15 visits)  Pt will demonstrate improved DF AROM to >= 8 degrees to promote proper foot clearance during gait and greater ease descending stairs without compensation-Progressing   Pt will have increased ankle strength to 5/5 throughout for improved ankle control with ADLs such as prolonged gait and stair negotiation -Progressing  Pt will have improved SLS to >10s on airex for increased ankle stability with ambulation on uneven surfaces such as gravel and grass-Progressing   Pt will report <2/10 pain with work and home activities such as taking her dog for a 1.5 mile walk.-Progressing     Pt will be independent and compliant with comprehensive HEP to maintain progress achieved in PT-on going        Plan: Continue skilled Physical Therapy 1-2 x/week or a total of 5 visits over a 90 day period. Treatment will include: TherEx, Neuro re-ed, manual tech       Patient/Family/Caregiver was advised of these findings, precautions, and treatment options and has agreed to actively participate in planning and for this course of care.    Thank you for your referral. If you have any questions, please contact me at Dept: [phone].    Sincerely,  Electronically signed by therapist: Nohemi Carrasquillo, PT     Physician's certification required:  Yes  Please co-sign or sign and return this letter via fax as soon as possible to [phone].   I certify the need for these services furnished under this plan of treatment and while under my care.    X___________________________________________________ Date____________________    Certification From: 10/21/2024  To:1/19/2025    Date: 9/17/24                TX#: 4/5 Date:9/24/24                 TX#: 5/5 Date:10/1/24   Tx#: 6/10 Date:10/8/24   Tx#: 8/10 Date:10/15/24   Tx#: 9/10 Date:10/22/24   Tx#:  10/10   Manual tech: 25 min  -STM to (R) gastroc and achilles  -sub talar mobs med/lat, level III Manual tech: 15 min  -STM/IASTM to (R) gastroc and achilles  -sub talar mobs med/lat, level III  -Trigger point dry needling to (R) medial/lateral gastroc (0.0twl28nt) Manual tech: 15 min  -STM/IASTM to (R) gastroc and achilles  -sub talar mobs med/lat, level III  -Gastoc stretches w/ heel assist  -lumbar p/a's  Manual tech: 15 min  -STM/IASTM to (R) gastroc and achilles  -sub talar mobs med/lat, level III  -Gastoc stretches w/ heel assist   Manual tech: 15 min  -STM/IASTM to (R) gastroc and achilles  -sub talar mobs med/lat, level III  -Gastoc stretches w/ heel assist Manual tech: 15 min  -STM/IASTM to (R) gastroc and achilles  -sub talar mobs med/lat, level III  -Gastoc stretches w/ heel assist   TherEx: 15 min  -NuStep: 6 min  -PROM (R) ankle  -pro stretch: 3x30 sec  -CKC ankle DF knee taps to wall: 2x10  -DL calf press: 50#, 2x10   TherEx: 20 min  -NuStep: 6 min  -PROM (R) ankle  -Calf stretch on step and soleus stretch: 3x30 sec  -DL heel raises: 2x10  -DL soleus raises: 2x10  -step up: 6\" step 2x10  -4\" step down: 2x10  -SL calf press: 37#, 2x10   TherEx: 20 min  -NuStep: 6 min  -PROM (R) ankle  -step up: 6\" step 2x10 each side  -lateral step up: x10 each side  -4\" step down: 2x10  -SL calf press: 37#, 2x10 TherEx: 10 min  -NuStep: 6 min  -PROM (R) ankle  -4\" step down: 2x10  -SL calf press: 37#, 3x15  -DL heel raise on 1/2 FR: 2x to fatigue  -calf stretch on step end of tx: 2x30 sec  TherEx: 15 min  -NuStep: 6 min  -PROM (R) ankle  -SL calf press: 37#, 3x15  -DL heel raise on 1/2 FR: 2x to fatigue  -calf stretch on step end of tx: 2x30 sec    TherEx: 15 min  -NuStep: 6 min  -PROM (R) ankle  -Re-assessment  -DL heel raise on 1/2 FR: 2x to fatigue  -calf stretch on step end of tx: 2x30 sec         Hold  SL RDL's   Eccentric gastroc  SL heel raise hold at bar   Neuro re-ed: 13 min  -anterior/post weight shifts:  x20  -standing arch lifting standing on airex: x20, 5 sec  -half tandem on airex: 2x30 sec each  -Marching in standing (w/ arch lift): x15 each side   Neuro re-ed: 10 min  -airex balance beam lateral walk: x4 cycles  -Wobble board taps A/P: x30 each side  -Marching in standing (w/ arch lift): x15 each side  -tandem: 2x30 sec Neuro re-ed: 10 min  -Marching in standing (w/ arch lift): x15 each side  -blaze pods SLB 4 way taps on floor: 3x30 sec  -1/2 tandem stance on airex with hand blaze pod taps(4 pods): 2x30 sec each side Neuro re-ed: 20 min  -SLB 3x30 sec  -Marching in standing on airex: x15 each side  -blaze pods SLB 4 way taps on floor: 2x30 sec each leg  -1/2 tandem stance on airex with hand blaze pod taps(4 pods): 2x30 sec each side  -airex balance beam lateral walk: 3 cycles Neuro re-ed: 15 min  -SLB 3x30 sec  -Marching in standing on airex: x20 each side  -blaze pods SLB 4 way taps on floor: 2x30 sec  -1/2 tandem stance on airex with hand blaze pod taps(4 pods): 2x30 sec each side  -airex balance beam lateral walk: 3 cycles  -SL wobble board taps A/P: 2x20 Neuro re-ed: 15 min  -modified tandem on airex w/ head rotations: 2x20 each  -SLB 3 way taps: 2 x to fatigue   -blaze pods SLB 4 way taps on floor: 2x30 sec  -SL wobble board taps A/P: 2x20           HEP:   Access Code: [email or web address]  URL: [email or web address]  Date: 10/22/2024  Prepared by: Nohemi Carrasquillo    Exercises  - Gastroc Stretch on Step  - 3 x daily - 7 x weekly - 3 sets - 20-30 seconds hold  - Standing Soleus Stretch on Step  - 3 x daily - 7 x weekly - 3 sets - 20-30 seconds hold  - Sidelying Hip Abduction  - 3 x daily - 5 x weekly - 3 sets - 10 reps  - Beginner Bridge  - 1 x daily - 5 x weekly - 3 sets - 10 reps  - Standing Heel Raises  - 1 x daily - 5 x weekly - 3 sets - 10 reps  - Half Tandem Stance Balance with Head Rotation  - 1 x daily - 5 x weekly - 3 sets - 10 reps  - Single Leg Stance  - 1 x daily - 5 x weekly - 3 sets - 30  seconds hold  - Single Leg Balance with Clock Reach  - 1 x daily - 5 x weekly - 2 sets    Charges: TherEx: 1 units, Manual tech: 1 unit, neuro re-ed: 1 unit        Total Timed Treatment: 45 min  Total Treatment Time: 45 min

## 2024-10-22 ENCOUNTER — OFFICE VISIT (OUTPATIENT)
Dept: PHYSICAL THERAPY | Age: 59
End: 2024-10-22
Attending: PODIATRIST
Payer: COMMERCIAL

## 2024-10-22 PROCEDURE — 97140 MANUAL THERAPY 1/> REGIONS: CPT | Performed by: PHYSICAL THERAPIST

## 2024-10-22 PROCEDURE — 97112 NEUROMUSCULAR REEDUCATION: CPT | Performed by: PHYSICAL THERAPIST

## 2024-10-22 PROCEDURE — 97110 THERAPEUTIC EXERCISES: CPT | Performed by: PHYSICAL THERAPIST

## 2024-10-24 DIAGNOSIS — J30.89 NON-SEASONAL ALLERGIC RHINITIS, UNSPECIFIED TRIGGER: ICD-10-CM

## 2024-10-24 RX ORDER — MONTELUKAST SODIUM 10 MG/1
10 TABLET ORAL DAILY
Qty: 90 TABLET | Refills: 0 | Status: SHIPPED | OUTPATIENT
Start: 2024-10-24

## 2024-10-24 NOTE — TELEPHONE ENCOUNTER
Triage call transferred.   Spoke with pt stating missed CPE today (10/24) and has been re-scheduled in December.   Pt requesting refill in the meantime-  montelukast 10 MG Oral Tab 30 tablet 0 9/26/2024 --   Sig:   Take 1 tablet (10 mg total) by mouth daily.       Confirmed [facility] on file. Pharmacy should notify pt when ready for . Pt verbalized understanding and agreed with POC.    Rx pended for your approval if ok.  Please review and advise. Thank you.

## 2024-10-25 RX ORDER — MONTELUKAST SODIUM 10 MG/1
10 TABLET ORAL DAILY
Qty: 30 TABLET | Refills: 0 | OUTPATIENT
Start: 2024-10-25

## 2024-10-25 NOTE — TELEPHONE ENCOUNTER
Please review. Protocol Failed; No Protocol   Future appointment:    12/12/2024 10:00 AM Nirali Jeffers DO [facility]         Requested Prescriptions   Pending Prescriptions Disp Refills    pantoprazole 20 MG Oral Tab EC 90 tablet 0     Sig: Take 1 tablet (20 mg total) by mouth before breakfast.       Gastrointestional Medication Protocol Passed - 10/25/2024  4:41 PM        Passed - In person appointment or virtual visit in the past 12 mos or appointment in next 3 mos     Recent Outpatient Visits              3 days ago     [facility] [place]    [place] Visit    1 week ago     [facility] [place] Nohemi Carrasquillo, PT    Office Visit    2 weeks ago     [facility] Nohemi Mcclelland, PT    Office Visit    3 weeks ago     [facility] [place]    Office Visit    1 month ago     [facility] [place] Nohemi Carrasquillo, PT    Office Visit          Future Appointments         Provider Department Appt Notes    In 4 days Nohemi Carrasquillo, PT [facility] [place] Last Auth Visit  10v 8/15 to 12/31  [employer] hMO  no c/p, 60v    In 1 week Nohemi Carrasquillo, PT [facility] [place] Auth?  10v 8/15 to 12/31  [employer] hMO  no c/p, 60v    In 3 weeks Nohemi Carrasquillo, PT [facility] [place] Auth?  10v 8/15 to 12/31  [employer] hMO  no c/p, 60v    In 3 weeks Nohemi Carrasquillo, PT [facility] [place] Auth?  10v 8/15 to 12/31  [employer]BS hMO  no c/p, 60v    In 1 month Nohemi Carrasquillo, PT [facility] [place] Auth?  10v 8/15 to 12/31  [employer]BS hMO  no c/p, 60v    In 1 month Nirali Jeffers DO [facility], [address] [place] [address] missed appt 10/24/24 PE                           Future Appointments         Provider Department Appt Notes    In 4 days Nohemi Carrasquillo, PT [facility] [place] Last Auth Visit  10v 8/15 to 12/31  [employer]BS hMO  no c/p,  60v    In 1 week Nohemi Carrasquillo, PT [facility] [place] Auth?  10v 8/15 to 12/31  [employer] hMO  no c/p, 60v    In 3 weeks Nohemi Carrasquillo, PT [facility] [place] Auth?  10v 8/15 to 12/31  [employer]BS hMO  no c/p, 60v    In 3 weeks Nohemi Carrasquillo, PT [facility] [place] Auth?  10v 8/15 to 12/31  [employer]BS hMO  no c/p, 60v    In 1 month Nohemi Carrasquillo, PT [facility] [place] Auth?  10v 8/15 to 12/31  [employer]BS hMO  no c/p, 60v    In 1 month Nirali Jeffers, [facility], [address] [place] [address] appt 10/24/24 PE          Recent Outpatient Visits              3 days ago     [facility] Nohemi Mcclelland, PT    Office Visit    1 week ago     [facility] Nohemi Mcclelland, PT    Office Visit    2 weeks ago     [facility] Nohemi Mcclelland, PT    Office Visit    3 weeks ago     [facility] Nohemi Mcclelland, PT    Office Visit    1 month ago     [facility] Nohemi Mcclelland, PT    Office Visit

## 2024-10-26 RX ORDER — PANTOPRAZOLE SODIUM 20 MG/1
20 TABLET, DELAYED RELEASE ORAL
Qty: 90 TABLET | Refills: 0 | Status: SHIPPED | OUTPATIENT
Start: 2024-10-26

## 2024-10-29 ENCOUNTER — OFFICE VISIT (OUTPATIENT)
Dept: PHYSICAL THERAPY | Age: 59
End: 2024-10-29
Attending: PODIATRIST
Payer: COMMERCIAL

## 2024-10-29 PROCEDURE — 97140 MANUAL THERAPY 1/> REGIONS: CPT | Performed by: PHYSICAL THERAPIST

## 2024-10-29 PROCEDURE — 97110 THERAPEUTIC EXERCISES: CPT | Performed by: PHYSICAL THERAPIST

## 2024-10-29 NOTE — PROGRESS NOTES
Diagnosis:   Retrocalcaneal bursitis (back of heel), right (M77.51)          Referring Provider: Sangita Wilson  Date of Evaluation:    8/20/24    PN on 10/22/24    Precautions:  None Next MD visit:   none scheduled  Date of Surgery: n/a   Insurance Primary/Secondary: [employer] / N/A     # Auth Visits: 10 visits till 12/31/24            Subjective: Pt states heel is a little sore due to being up this morning taking care of her dog who was sprayed by skunk. She has also began to walk about 3/4 of a mile with her dog the last 2 days which may also be reason for increased soreness. She is not stretching as much as she maybe shoulder.     Pain: 2/10 currently, 2-3/10 at worst      Objective:        Strength/MMT: (* denotes performed with pain)  Hip Knee Foot/Ankle   Flexion: R 4-/5; L 4-/5  Extension: R 3+/5; L 3+/5  Abduction: R 3+/5; L 3+/5  ER: R 4/5; L 4/5    Flexion: R 5/5; L 5/5  Extension: R 5/5; L 4/5    DF: R 4+/5; L 4+/5  PF: R 4+/5; L 4+/5  INV: R 4+/5; L 4+/5  EV: R 4+/5; L 4+/5           Gait: pt ambulates on level ground with normal mechanics     Balance:   SLS: R 21 sec, L 7 sec        Assessment: Focused tx on manual tech to improve soft tissue mobility and flexibility to reduce heel pain. She is progressed with SL RDL and hip abd and ext to promote hip strength and LE stability to improve tolerance to walking. She tolerates tx well w/o complaints.       Goals:   (to be met in 15 visits)  Pt will demonstrate improved DF AROM to >= 8 degrees to promote proper foot clearance during gait and greater ease descending stairs without compensation-Progressing   Pt will have increased ankle strength to 5/5 throughout for improved ankle control with ADLs such as prolonged gait and stair negotiation -Progressing  Pt will have improved SLS to >10s on airex for increased ankle stability with ambulation on uneven surfaces such as gravel and grass-Progressing   Pt will report <2/10 pain with work and home  activities such as taking her dog for a 1.5 mile walk.-Progressing     Pt will be independent and compliant with comprehensive HEP to maintain progress achieved in PT-on going        Plan: Continue skilled Physical Therapy 1-2 x/week or a total of 5 visits over a 90 day period. Treatment will include: TherEx, Neuro re-ed, manual tech       Date:10/8/24   Tx#: 8/10 Date:10/15/24   Tx#: 9/10 Date:10/22/24   Tx#: 10/10 Date:10/29/24   Tx#: 11/15   Manual tech: 15 min  -STM/IASTM to (R) gastroc and achilles  -sub talar mobs med/lat, level III  -Gastoc stretches w/ heel assist   Manual tech: 15 min  -STM/IASTM to (R) gastroc and achilles  -sub talar mobs med/lat, level III  -Gastoc stretches w/ heel assist Manual tech: 15 min  -STM/IASTM to (R) gastroc and achilles  -sub talar mobs med/lat, level III  -Gastoc stretches w/ heel assist Manual tech: 25 min  -STM/IASTM to (R) gastroc and achilles  -sub talar mobs med/lat, level III  -Gastoc stretches w/ heel assist   TherEx: 10 min  -NuStep: 6 min  -PROM (R) ankle  -4\" step down: 2x10  -SL calf press: 37#, 3x15  -DL heel raise on 1/2 FR: 2x to fatigue  -calf stretch on step end of tx: 2x30 sec  TherEx: 15 min  -NuStep: 6 min  -PROM (R) ankle  -SL calf press: 37#, 3x15  -DL heel raise on 1/2 FR: 2x to fatigue  -calf stretch on step end of tx: 2x30 sec    TherEx: 15 min  -NuStep: 6 min  -PROM (R) ankle  -Re-assessment  -DL heel raise on 1/2 FR: 2x to fatigue  -calf stretch on step end of tx: 2x30 sec         Hold  SL RDL's   Eccentric gastroc  SL heel raise hold at bar TherEx: 15 min  -NuStep: 6 min  -PROM (R) ankle  -DL heel raise on 1/2 FR: 2x to fatigue  -calf stretch on step start and end of tx: 2x30 sec   -SL RDL's: 2x10  -standing hip ext and abductions: RTB, 2x10 each leg        Hold  Eccentric gastroc  SL heel raise hold at bar   Neuro re-ed: 20 min  -SLB 3x30 sec  -Marching in standing on airex: x15 each side  -blaze pods SLB 4 way taps on floor: 2x30 sec each  leg  -1/2 tandem stance on airex with hand blaze pod taps(4 pods): 2x30 sec each side  -airex balance beam lateral walk: 3 cycles Neuro re-ed: 15 min  -SLB 3x30 sec  -Marching in standing on airex: x20 each side  -blaze pods SLB 4 way taps on floor: 2x30 sec  -1/2 tandem stance on airex with hand blaze pod taps(4 pods): 2x30 sec each side  -airex balance beam lateral walk: 3 cycles  -SL wobble board taps A/P: 2x20 Neuro re-ed: 15 min  -modified tandem on airex w/ head rotations: 2x20 each  -SLB 3 way taps: 2 x to fatigue   -blaze pods SLB 4 way taps on floor: 2x30 sec  -SL wobble board taps A/P: 2x20 Neuro re-ed: 5 min  -modified tandem on airex w/ head rotations: 2x20 each  -SLB 3 way taps: 2 x to fatigue            HEP:   Access Code: [email or web address]  URL: [email or web address]  Date: 10/22/2024  Prepared by: Nohemi Carrasquillo    Exercises  - Gastroc Stretch on Step  - 3 x daily - 7 x weekly - 3 sets - 20-30 seconds hold  - Standing Soleus Stretch on Step  - 3 x daily - 7 x weekly - 3 sets - 20-30 seconds hold  - Sidelying Hip Abduction  - 3 x daily - 5 x weekly - 3 sets - 10 reps  - Beginner Bridge  - 1 x daily - 5 x weekly - 3 sets - 10 reps  - Standing Heel Raises  - 1 x daily - 5 x weekly - 3 sets - 10 reps  - Half Tandem Stance Balance with Head Rotation  - 1 x daily - 5 x weekly - 3 sets - 10 reps  - Single Leg Stance  - 1 x daily - 5 x weekly - 3 sets - 30 seconds hold  - Single Leg Balance with Clock Reach  - 1 x daily - 5 x weekly - 2 sets    Charges: TherEx: 1 units, Manual tech: 2 unit   Total Timed Treatment: 45 min  Total Treatment Time: 45 min

## 2024-11-04 NOTE — TELEPHONE ENCOUNTER
Patient missed 10/24 appointment. Rescheduled     Future Appointments   Date Time Provider Department Center   11/6/2024  1:15 PM [facility]   11/15/2024 10:30 AM No[facility]i Carrasquillo, PT [facility]   11/20/2024 11:45 AM Nohemi Carrasquillo, PT [facility]   12/10/2024  9:45 AM Nohemi Carrasquillo, PT [facility]   12/12/2024 10:00 AM Nirali Jeffers, DO [facility]

## 2024-11-05 NOTE — PROGRESS NOTES
Diagnosis:   Retrocalcaneal bursitis (back of heel), right (M77.51)           Referring Provider: Sangita Wilson  Date of Evaluation:    8/20/24     PN on 10/22/24    Precautions:  None Next MD visit:   none scheduled  Date of Surgery: n/a   Insurance Primary/Secondary: [employer] [place] / N/A     # Auth Visits: 10 visits till 12/31/24             Subjective: Pt states she had one day of increased heel pain for no reason she can't think of anything that could have aggravated it. No pain currently.      Pain: 0/10 currently, 2-3/10 at worst        Objective:         Strength/MMT: (* denotes performed with pain)  Hip Knee Foot/Ankle   Flexion: R 4-/5; L 4-/5  Extension: R 3+/5; L 3+/5  Abduction: R 3+/5; L 3+/5  ER: R 4/5; L 4/5    Flexion: R 5/5; L 5/5  Extension: R 5/5; L 4/5    DF: R 4+/5; L 4+/5  PF: R 4+/5; L 4+/5  INV: R 4+/5; L 4+/5  EV: R 4+/5; L 4+/5            Gait: pt ambulates on level ground with normal mechanics     Balance:   SLS: R 21 sec, L 7 sec           Assessment: Advanced various balance exercises and progressed to Eccentric calf raises to improve strength and stability to increase tolerance to weight bearing activities. Overall, she reports fatigue but no major complaints of increase in heel pain.         Goals:   (to be met in 15 visits)  Pt will demonstrate improved DF AROM to >= 8 degrees to promote proper foot clearance during gait and greater ease descending stairs without compensation-Progressing   Pt will have increased ankle strength to 5/5 throughout for improved ankle control with ADLs such as prolonged gait and stair negotiation -Progressing  Pt will have improved SLS to >10s on airex for increased ankle stability with ambulation on uneven surfaces such as gravel and grass-Progressing   Pt will report <2/10 pain with work and home activities such as taking her dog for a 1.5 mile walk.-Progressing     Pt will be independent and compliant with comprehensive HEP to maintain progress  achieved in PT-on going           Plan: Continue skilled Physical Therapy 1-2 x/week or a total of 5 visits over a 90 day period. Treatment will include: TherEx, Neuro re-ed, manual tech        Date:10/8/24   Tx#: 8/10 Date:10/15/24   Tx#: 9/10 Date:10/22/24   Tx#: 10/10 Date:10/29/24   Tx#: 11/15 Date:11/6/24   Tx#: 11/15  (Per M.P)   Manual tech: 15 min  -STM/IASTM to (R) gastroc and achilles  -sub talar mobs med/lat, level III  -Gastoc stretches w/ heel assist    Manual tech: 15 min  -STM/IASTM to (R) gastroc and achilles  -sub talar mobs med/lat, level III  -Gastoc stretches w/ heel assist Manual tech: 15 min  -STM/IASTM to (R) gastroc and achilles  -sub talar mobs med/lat, level III  -Gastoc stretches w/ heel assist Manual tech: 25 min  -STM/IASTM to (R) gastroc and achilles  -sub talar mobs med/lat, level III  -Gastoc stretches w/ heel assist Manual tech: 15 min  -STM/IASTM to (R) gastroc and achilles  -sub talar mobs med/lat, level III  -Gastoc stretches w/ heel assist   TherEx: 10 min  -NuStep: 6 min  -PROM (R) ankle  -4\" step down: 2x10  -SL calf press: 37#, 3x15  -DL heel raise on 1/2 FR: 2x to fatigue  -calf stretch on step end of tx: 2x30 sec  TherEx: 15 min  -NuStep: 6 min  -PROM (R) ankle  -SL calf press: 37#, 3x15  -DL heel raise on 1/2 FR: 2x to fatigue  -calf stretch on step end of tx: 2x30 sec     TherEx: 15 min  -NuStep: 6 min  -PROM (R) ankle  -Re-assessment  -DL heel raise on 1/2 FR: 2x to fatigue  -calf stretch on step end of tx: 2x30 sec            Hold  SL RDL's   Eccentric gastroc  SL heel raise hold at bar TherEx: 15 min  -NuStep: 6 min  -PROM (R) ankle  -DL heel raise on 1/2 FR: 2x to fatigue  -calf stretch on step start and end of tx: 2x30 sec   -SL RDL's: 2x10  -standing hip ext and abductions: RTB, 2x10 each leg           Hold  Eccentric gastroc  SL heel raise hold at bar TherEx: 20 min  -NuStep: 6 min  -PROM (R) ankle  -calf stretch on step start and end of tx: 2x30 sec   -Eccentric  heel raises: up w/ 2, down w/ 1: 2x10  -DL heel raise on 1/2 FR: 2x to fatigue  -SL RDL's: 2x10  -standing hip ext and abductions: RTB, 2x10 each leg           Hold  SL heel raise hold at bar   Neuro re-ed: 20 min  -SLB 3x30 sec  -Marching in standing on airex: x15 each side  -blaze pods SLB 4 way taps on floor: 2x30 sec each leg  -1/2 tandem stance on airex with hand blaze pod taps(4 pods): 2x30 sec each side  -airex balance beam lateral walk: 3 cycles Neuro re-ed: 15 min  -SLB 3x30 sec  -Marching in standing on airex: x20 each side  -blaze pods SLB 4 way taps on floor: 2x30 sec  -1/2 tandem stance on airex with hand blaze pod taps(4 pods): 2x30 sec each side  -airex balance beam lateral walk: 3 cycles  -SL wobble board taps A/P: 2x20 Neuro re-ed: 15 min  -modified tandem on airex w/ head rotations: 2x20 each  -SLB 3 way taps: 2 x to fatigue   -blaze pods SLB 4 way taps on floor: 2x30 sec  -SL wobble board taps A/P: 2x20 Neuro re-ed: 5 min  -modified tandem on airex w/ head rotations: 2x20 each  -SLB 3 way taps: 2 x to fatigue     Neuro re-ed: 10 min  -tandem on airex w/ head rotations: 2x20 each  -SLB 3 way taps: 2 x to fatigue   -Yamini disc lunge hold: BL, 2x30 sec  -Bosu wide stance hold w/ head rotations: 2x30 sec              HEP:   Access Code: [email or web address]  URL: [email or web address]  Date: 10/22/2024  Prepared by: Nohemi Carrasquillo     Exercises  - Gastroc Stretch on Step  - 3 x daily - 7 x weekly - 3 sets - 20-30 seconds hold  - Standing Soleus Stretch on Step  - 3 x daily - 7 x weekly - 3 sets - 20-30 seconds hold  - Sidelying Hip Abduction  - 3 x daily - 5 x weekly - 3 sets - 10 reps  - Beginner Bridge  - 1 x daily - 5 x weekly - 3 sets - 10 reps  - Standing Heel Raises  - 1 x daily - 5 x weekly - 3 sets - 10 reps  - Half Tandem Stance Balance with Head Rotation  - 1 x daily - 5 x weekly - 3 sets - 10 reps  - Single Leg Stance  - 1 x daily - 5 x weekly - 3 sets - 30 seconds hold  - Single Leg  Balance with Clock Reach  - 1 x daily - 5 x weekly - 2 sets     Charges: TherEx: 1 units, Manual tech: 1 unit, neuro re-ed: 1 unit     Total Timed Treatment: 45 min              Total Treatment Time: 45 min

## 2024-11-06 ENCOUNTER — OFFICE VISIT (OUTPATIENT)
Dept: PHYSICAL THERAPY | Age: 59
End: 2024-11-06
Attending: PODIATRIST
Payer: COMMERCIAL

## 2024-11-06 PROCEDURE — 97112 NEUROMUSCULAR REEDUCATION: CPT | Performed by: PHYSICAL THERAPIST

## 2024-11-06 PROCEDURE — 97140 MANUAL THERAPY 1/> REGIONS: CPT | Performed by: PHYSICAL THERAPIST

## 2024-11-06 PROCEDURE — 97110 THERAPEUTIC EXERCISES: CPT | Performed by: PHYSICAL THERAPIST

## 2024-11-13 NOTE — PROGRESS NOTES
Diagnosis:   Retrocalcaneal bursitis (back of heel), right (M77.51)           Referring Provider: Sangita Wilson  Date of Evaluation:    8/20/24     PN on 10/22/24    Precautions:  None Next MD visit:   none scheduled  Date of Surgery: n/a   Insurance Primary/Secondary: [employer] [place] / N/A     # Auth Visits: 10 visits till 12/31/24             Subjective: Pt states her heel has been feeling good that she is not aware of it as much during the day.   Pain: 0/10 currently, 2-3/10 at worst       Objective:         Improving STR's and tenderness of (R) achilles           Assessment: pt demonstrates reducing tenderness of the (R) lateral aspect of the achilles. She is progressed with tandem walk on airex. She is challenged with ernst disc lunge holds. She is educated on gradually reducing height of the heel lifts as her symptoms have reduced.         Goals:   (to be met in 15 visits)  Pt will demonstrate improved DF AROM to >= 8 degrees to promote proper foot clearance during gait and greater ease descending stairs without compensation-Progressing   Pt will have increased ankle strength to 5/5 throughout for improved ankle control with ADLs such as prolonged gait and stair negotiation -Progressing  Pt will have improved SLS to >10s on airex for increased ankle stability with ambulation on uneven surfaces such as gravel and grass-Progressing   Pt will report <2/10 pain with work and home activities such as taking her dog for a 1.5 mile walk.-Progressing     Pt will be independent and compliant with comprehensive HEP to maintain progress achieved in PT-on going           Plan: Discharged to HEP next tx       Date:10/8/24   Tx#: 8/10 Date:10/15/24   Tx#: 9/10 Date:10/22/24   Tx#: 10/10 Date:10/29/24   Tx#: 11/15 Date:11/6/24   Tx#: 11/15  (Per M.P) Date:11/15/24   Tx#: 12/15  (Per M.P)   Manual tech: 15 min  -STM/IASTM to (R) gastroc and achilles  -sub talar mobs med/lat, level III  -Gastoc stretches w/ heel assist     Manual tech: 15 min  -STM/IASTM to (R) gastroc and achilles  -sub talar mobs med/lat, level III  -Gastoc stretches w/ heel assist Manual tech: 15 min  -STM/IASTM to (R) gastroc and achilles  -sub talar mobs med/lat, level III  -Gastoc stretches w/ heel assist Manual tech: 25 min  -STM/IASTM to (R) gastroc and achilles  -sub talar mobs med/lat, level III  -Gastoc stretches w/ heel assist Manual tech: 15 min  -STM/IASTM to (R) gastroc and achilles  -sub talar mobs med/lat, level III  -Gastoc stretches w/ heel assist Manual tech: 17 min  -STM/IASTM to (R) gastroc and achilles  -sub talar mobs med/lat, level III  -Gastoc stretches w/ heel assist   TherEx: 10 min  -NuStep: 6 min  -PROM (R) ankle  -4\" step down: 2x10  -SL calf press: 37#, 3x15  -DL heel raise on 1/2 FR: 2x to fatigue  -calf stretch on step end of tx: 2x30 sec  TherEx: 15 min  -NuStep: 6 min  -PROM (R) ankle  -SL calf press: 37#, 3x15  -DL heel raise on 1/2 FR: 2x to fatigue  -calf stretch on step end of tx: 2x30 sec     TherEx: 15 min  -NuStep: 6 min  -PROM (R) ankle  -Re-assessment  -DL heel raise on 1/2 FR: 2x to fatigue  -calf stretch on step end of tx: 2x30 sec            Hold  SL RDL's   Eccentric gastroc  SL heel raise hold at bar TherEx: 15 min  -NuStep: 6 min  -PROM (R) ankle  -DL heel raise on 1/2 FR: 2x to fatigue  -calf stretch on step start and end of tx: 2x30 sec   -SL RDL's: 2x10  -standing hip ext and abductions: RTB, 2x10 each leg           Hold  Eccentric gastroc  SL heel raise hold at bar TherEx: 20 min  -NuStep: 6 min  -PROM (R) ankle  -calf stretch on step start and end of tx: 2x30 sec   -Eccentric heel raises: up w/ 2, down w/ 1: 2x10  -DL heel raise on 1/2 FR: 2x to fatigue  -SL RDL's: 2x10  -standing hip ext and abductions: RTB, 2x10 each leg           Hold  SL heel raise hold at bar TherEx: 15 min  -NuStep: 6 min  -PROM (R) ankle  -calf stretch on step start and end of tx: 2x30 sec   -Eccentric heel raises: up w/ 2, down w/ 1:  2x10  -DL heel raise on 1/2 FR: 2x to fatigue             Hold  SL heel raise hold at bar   Neuro re-ed: 20 min  -SLB 3x30 sec  -Marching in standing on airex: x15 each side  -blaze pods SLB 4 way taps on floor: 2x30 sec each leg  -1/2 tandem stance on airex with hand blaze pod taps(4 pods): 2x30 sec each side  -airex balance beam lateral walk: 3 cycles Neuro re-ed: 15 min  -SLB 3x30 sec  -Marching in standing on airex: x20 each side  -blaze pods SLB 4 way taps on floor: 2x30 sec  -1/2 tandem stance on airex with hand blaze pod taps(4 pods): 2x30 sec each side  -airex balance beam lateral walk: 3 cycles  -SL wobble board taps A/P: 2x20 Neuro re-ed: 15 min  -modified tandem on airex w/ head rotations: 2x20 each  -SLB 3 way taps: 2 x to fatigue   -blaze pods SLB 4 way taps on floor: 2x30 sec  -SL wobble board taps A/P: 2x20 Neuro re-ed: 5 min  -modified tandem on airex w/ head rotations: 2x20 each  -SLB 3 way taps: 2 x to fatigue     Neuro re-ed: 10 min  -tandem on airex w/ head rotations: 2x20 each  -SLB 3 way taps: 2 x to fatigue   -Yamini disc lunge hold: BL, 2x30 sec  -Bosu wide stance hold w/ head rotations: 2x30 sec Neuro re-ed: 8 min  -Yamini disc lunge hold: BL, 2x30 sec  -Bosu wide stance hold w/ head rotations: 2x30 sec  -airex balance beam tandem walk forward: x5 cycles               HEP:   Access Code: [email or web address]  URL: [email or web address]  Date: 10/22/2024  Prepared by: Nohemi Carrasquillo     Exercises  - Gastroc Stretch on Step  - 3 x daily - 7 x weekly - 3 sets - 20-30 seconds hold  - Standing Soleus Stretch on Step  - 3 x daily - 7 x weekly - 3 sets - 20-30 seconds hold  - Sidelying Hip Abduction  - 3 x daily - 5 x weekly - 3 sets - 10 reps  - Beginner Bridge  - 1 x daily - 5 x weekly - 3 sets - 10 reps  - Standing Heel Raises  - 1 x daily - 5 x weekly - 3 sets - 10 reps  - Half Tandem Stance Balance with Head Rotation  - 1 x daily - 5 x weekly - 3 sets - 10 reps  - Single Leg Stance  - 1 x  daily - 5 x weekly - 3 sets - 30 seconds hold  - Single Leg Balance with Clock Reach  - 1 x daily - 5 x weekly - 2 sets     Charges: TherEx: 1 units, Manual tech: 1 unit, neuro re-ed: 1 unit     Total Timed Treatment: 40 min              Total Treatment Time: 40 min

## 2024-11-15 ENCOUNTER — OFFICE VISIT (OUTPATIENT)
Dept: PHYSICAL THERAPY | Age: 59
End: 2024-11-15
Attending: PODIATRIST
Payer: COMMERCIAL

## 2024-11-15 PROCEDURE — 97112 NEUROMUSCULAR REEDUCATION: CPT | Performed by: PHYSICAL THERAPIST

## 2024-11-15 PROCEDURE — 97110 THERAPEUTIC EXERCISES: CPT | Performed by: PHYSICAL THERAPIST

## 2024-11-15 PROCEDURE — 97140 MANUAL THERAPY 1/> REGIONS: CPT | Performed by: PHYSICAL THERAPIST

## 2024-11-20 ENCOUNTER — APPOINTMENT (OUTPATIENT)
Dept: PHYSICAL THERAPY | Age: 59
End: 2024-11-20
Attending: PODIATRIST
Payer: COMMERCIAL

## 2024-11-20 ENCOUNTER — OFFICE VISIT (OUTPATIENT)
Dept: PHYSICAL THERAPY | Age: 59
End: 2024-11-20
Attending: PODIATRIST
Payer: COMMERCIAL

## 2024-11-20 PROCEDURE — 97112 NEUROMUSCULAR REEDUCATION: CPT | Performed by: PHYSICAL THERAPIST

## 2024-11-20 PROCEDURE — 97140 MANUAL THERAPY 1/> REGIONS: CPT | Performed by: PHYSICAL THERAPIST

## 2024-11-20 PROCEDURE — 97110 THERAPEUTIC EXERCISES: CPT | Performed by: PHYSICAL THERAPIST

## 2024-11-20 NOTE — PROGRESS NOTES
Diagnosis:   Retrocalcaneal bursitis (back of heel), right (M77.51)           Referring Provider: Sangita Wilson  Date of Evaluation:    8/20/24     PN on 10/22/24    Precautions:  None Next MD visit:   none scheduled  Date of Surgery: n/a   Insurance Primary/Secondary: [employer] [place] / N/A     # Auth Visits: 10 visits till 12/31/24             Subjective: Pt states her heel has been feeling good that she is not aware of it as much during the day.   Pain: 0/10 currently, 2-3/10 at worst       Objective:         Improving STR's and tenderness of (R) achilles           Assessment: Overall, pt is progressing well and plan on discharge next visits. She is tolerating new SLB 3 cone tap exercises well and eccentric heel raises. She reports no major pains recently.         Goals:   (to be met in 15 visits)  Pt will demonstrate improved DF AROM to >= 8 degrees to promote proper foot clearance during gait and greater ease descending stairs without compensation-Progressing   Pt will have increased ankle strength to 5/5 throughout for improved ankle control with ADLs such as prolonged gait and stair negotiation -Progressing  Pt will have improved SLS to >10s on airex for increased ankle stability with ambulation on uneven surfaces such as gravel and grass-Progressing   Pt will report <2/10 pain with work and home activities such as taking her dog for a 1.5 mile walk.-Progressing     Pt will be independent and compliant with comprehensive HEP to maintain progress achieved in PT-on going           Plan: Discharged to HEP next tx       Date:10/8/24   Tx#: 8/10 Date:10/15/24   Tx#: 9/10 Date:10/22/24   Tx#: 10/10 Date:10/29/24   Tx#: 11/15 Date:11/6/24   Tx#: 11/15  (Per M.P) Date:11/15/24   Tx#: 12/15  (Per M.P) Date:11/20/24   Tx#: 13/15  (Per M.P)   Manual tech: 15 min  -STM/IASTM to (R) gastroc and achilles  -sub talar mobs med/lat, level III  -Gastoc stretches w/ heel assist    Manual tech: 15 min  -STM/IASTM to (R) gastroc  and achilles  -sub talar mobs med/lat, level III  -Gastoc stretches w/ heel assist Manual tech: 15 min  -STM/IASTM to (R) gastroc and achilles  -sub talar mobs med/lat, level III  -Gastoc stretches w/ heel assist Manual tech: 25 min  -STM/IASTM to (R) gastroc and achilles  -sub talar mobs med/lat, level III  -Gastoc stretches w/ heel assist Manual tech: 15 min  -STM/IASTM to (R) gastroc and achilles  -sub talar mobs med/lat, level III  -Gastoc stretches w/ heel assist Manual tech: 17 min  -STM/IASTM to (R) gastroc and achilles  -sub talar mobs med/lat, level III  -Gastoc stretches w/ heel assist Manual tech: 15 min  -STM/IASTM to (R) gastroc and achilles  -sub talar mobs med/lat, level III  -Gastoc stretches w/ heel assist   TherEx: 10 min  -NuStep: 6 min  -PROM (R) ankle  -4\" step down: 2x10  -SL calf press: 37#, 3x15  -DL heel raise on 1/2 FR: 2x to fatigue  -calf stretch on step end of tx: 2x30 sec  TherEx: 15 min  -NuStep: 6 min  -PROM (R) ankle  -SL calf press: 37#, 3x15  -DL heel raise on 1/2 FR: 2x to fatigue  -calf stretch on step end of tx: 2x30 sec     TherEx: 15 min  -NuStep: 6 min  -PROM (R) ankle  -Re-assessment  -DL heel raise on 1/2 FR: 2x to fatigue  -calf stretch on step end of tx: 2x30 sec            Hold  SL RDL's   Eccentric gastroc  SL heel raise hold at bar TherEx: 15 min  -NuStep: 6 min  -PROM (R) ankle  -DL heel raise on 1/2 FR: 2x to fatigue  -calf stretch on step start and end of tx: 2x30 sec   -SL RDL's: 2x10  -standing hip ext and abductions: RTB, 2x10 each leg           Hold  Eccentric gastroc  SL heel raise hold at bar TherEx: 20 min  -NuStep: 6 min  -PROM (R) ankle  -calf stretch on step start and end of tx: 2x30 sec   -Eccentric heel raises: up w/ 2, down w/ 1: 2x10  -DL heel raise on 1/2 FR: 2x to fatigue  -SL RDL's: 2x10  -standing hip ext and abductions: RTB, 2x10 each leg           Hold  SL heel raise hold at bar TherEx: 15 min  -NuStep: 6 min  -PROM (R) ankle  -calf stretch on  step start and end of tx: 2x30 sec   -Eccentric heel raises: up w/ 2, down w/ 1: 2x10  -DL heel raise on 1/2 FR: 2x to fatigue             Hold  SL heel raise hold at bar TherEx: 20 min  -NuStep: 6 min  -PROM (R) ankle  -calf stretch on step start and end of tx: 2x30 sec   -Eccentric heel raises: up w/ 2, down w/ 1: 2x10  -DL heel raise on 1/2 FR: 2x to fatigue             Hold  SL heel raise hold at bar   Neuro re-ed: 20 min  -SLB 3x30 sec  -Marching in standing on airex: x15 each side  -blaze pods SLB 4 way taps on floor: 2x30 sec each leg  -1/2 tandem stance on airex with hand blaze pod taps(4 pods): 2x30 sec each side  -airex balance beam lateral walk: 3 cycles Neuro re-ed: 15 min  -SLB 3x30 sec  -Marching in standing on airex: x20 each side  -blaze pods SLB 4 way taps on floor: 2x30 sec  -1/2 tandem stance on airex with hand blaze pod taps(4 pods): 2x30 sec each side  -airex balance beam lateral walk: 3 cycles  -SL wobble board taps A/P: 2x20 Neuro re-ed: 15 min  -modified tandem on airex w/ head rotations: 2x20 each  -SLB 3 way taps: 2 x to fatigue   -blaze pods SLB 4 way taps on floor: 2x30 sec  -SL wobble board taps A/P: 2x20 Neuro re-ed: 5 min  -modified tandem on airex w/ head rotations: 2x20 each  -SLB 3 way taps: 2 x to fatigue     Neuro re-ed: 10 min  -tandem on airex w/ head rotations: 2x20 each  -SLB 3 way taps: 2 x to fatigue   -Yamini disc lunge hold: BL, 2x30 sec  -Bosu wide stance hold w/ head rotations: 2x30 sec Neuro re-ed: 8 min  -Yamini disc lunge hold: BL, 2x30 sec  -Bosu wide stance hold w/ head rotations: 2x30 sec  -airex balance beam tandem walk forward: x5 cycles Neuro re-ed: 10 min  -Yamini disc lunge hold: BL, 2x30 sec  -Bosu wide stance hold w/ head rotations: 2x30 sec  -airex balance beam tandem walk forward: x5 cycles  -SLB w/ 3 cone taps: 2x to fatigue                HEP:   Access Code: [email or web address]  URL: [email or web address]  Date: 10/22/2024  Prepared by: Nohemi Carrasquillo      Exercises  - Gastroc Stretch on Step  - 3 x daily - 7 x weekly - 3 sets - 20-30 seconds hold  - Standing Soleus Stretch on Step  - 3 x daily - 7 x weekly - 3 sets - 20-30 seconds hold  - Sidelying Hip Abduction  - 3 x daily - 5 x weekly - 3 sets - 10 reps  - Beginner Bridge  - 1 x daily - 5 x weekly - 3 sets - 10 reps  - Standing Heel Raises  - 1 x daily - 5 x weekly - 3 sets - 10 reps  - Half Tandem Stance Balance with Head Rotation  - 1 x daily - 5 x weekly - 3 sets - 10 reps  - Single Leg Stance  - 1 x daily - 5 x weekly - 3 sets - 30 seconds hold  - Single Leg Balance with Clock Reach  - 1 x daily - 5 x weekly - 2 sets     Charges: TherEx: 1 units, Manual tech: 1 unit, neuro re-ed: 1 unit     Total Timed Treatment: 45 min              Total Treatment Time: 45 min

## 2024-12-09 NOTE — PROGRESS NOTES
Diagnosis:   Retrocalcaneal bursitis (back of heel), right (M77.51)           Referring Provider: Sangita Wilson  Date of Evaluation:    8/20/24     PN on 10/22/24    Precautions:  None Next MD visit:   none scheduled  Date of Surgery: n/a   Insurance Primary/Secondary: [employer] [place] / N/A     # Auth Visits: 15 visits till 12/31/24             Subjective: Pt states she limped around [place] due to prolonged walking. Her heel and knees were sore. She kept up with the stretches. Her pain did calm down in the last 2 days with rest. The ankle was swollen all the way around. Stairs were hard.     Pain: 0/10 currently, 4/10 at worst       Objective:         Tandem balance on airex: >30 sec           Assessment: Pt presents with recently aggravated symptoms due to a trip in [place] which required prolonged walking and repetitive stair negotiation. Focused on manual tech to improve soft tissue mobility and reduce pain. She is challenged with all balance exercises and reports no major increase in pain        Goals:   (to be met in 15 visits)  Pt will demonstrate improved DF AROM to >= 8 degrees to promote proper foot clearance during gait and greater ease descending stairs without compensation-Progressing   Pt will have increased ankle strength to 5/5 throughout for improved ankle control with ADLs such as prolonged gait and stair negotiation -Progressing  Pt will have improved SLS to >10s on airex for increased ankle stability with ambulation on uneven surfaces such as gravel and grass-Progressing   Pt will report <2/10 pain with work and home activities such as taking her dog for a 1.5 mile walk.-Progressing     Pt will be independent and compliant with comprehensive HEP to maintain progress achieved in PT-on going           Plan: Discharged to HEP next tx       Date:10/29/24   Tx#: 11/15 Date:11/6/24   Tx#: 11/15  (Per M.P) Date:11/15/24   Tx#: 12/15  (Per M.P) Date:11/20/24   Tx#: 13/15  (Per M.P) Date:12/10/24   Tx#:  14/15  (Per M.P)   Manual tech: 25 min  -STM/IASTM to (R) gastroc and achilles  -sub talar mobs med/lat, level III  -Gastoc stretches w/ heel assist Manual tech: 15 min  -STM/IASTM to (R) gastroc and achilles  -sub talar mobs med/lat, level III  -Gastoc stretches w/ heel assist Manual tech: 17 min  -STM/IASTM to (R) gastroc and achilles  -sub talar mobs med/lat, level III  -Gastoc stretches w/ heel assist Manual tech: 15 min  -STM/IASTM to (R) gastroc and achilles  -sub talar mobs med/lat, level III  -Gastoc stretches w/ heel assist Manual tech: 20 min  -STM/IASTM to (R) gastroc and achilles  -sub talar mobs med/lat, level III  -Gastoc stretches w/ heel assist   TherEx: 15 min  -NuStep: 6 min  -PROM (R) ankle  -DL heel raise on 1/2 FR: 2x to fatigue  -calf stretch on step start and end of tx: 2x30 sec   -SL RDL's: 2x10  -standing hip ext and abductions: RTB, 2x10 each leg           Hold  Eccentric gastroc  SL heel raise hold at bar TherEx: 20 min  -NuStep: 6 min  -PROM (R) ankle  -calf stretch on step start and end of tx: 2x30 sec   -Eccentric heel raises: up w/ 2, down w/ 1: 2x10  -DL heel raise on 1/2 FR: 2x to fatigue  -SL RDL's: 2x10  -standing hip ext and abductions: RTB, 2x10 each leg           Hold  SL heel raise hold at bar TherEx: 15 min  -NuStep: 6 min  -PROM (R) ankle  -calf stretch on step start and end of tx: 2x30 sec   -Eccentric heel raises: up w/ 2, down w/ 1: 2x10  -DL heel raise on 1/2 FR: 2x to fatigue             Hold  SL heel raise hold at bar TherEx: 20 min  -NuStep: 6 min  -PROM (R) ankle  -calf stretch on step start and end of tx: 2x30 sec   -Eccentric heel raises: up w/ 2, down w/ 1: 2x10  -DL heel raise on 1/2 FR: 2x to fatigue             Hold  SL heel raise hold at bar TherEx: 15 min  -NuStep: 7 min  -PROM (R) ankle  -calf stretch on step start and end of tx: 2x30 sec   -Eccentric heel raises: up w/ 2, down w/ 1: 2x10-hold  -DL heel raise on 1/2 FR: 2x to fatigue             Hold  SL heel  raise hold at bar   Neuro re-ed: 5 min  -modified tandem on airex w/ head rotations: 2x20 each  -SLB 3 way taps: 2 x to fatigue     Neuro re-ed: 10 min  -tandem on airex w/ head rotations: 2x20 each  -SLB 3 way taps: 2 x to fatigue   -Yamini disc lunge hold: BL, 2x30 sec  -Bosu wide stance hold w/ head rotations: 2x30 sec Neuro re-ed: 8 min  -Yamini disc lunge hold: BL, 2x30 sec  -Bosu wide stance hold w/ head rotations: 2x30 sec  -airex balance beam tandem walk forward: x5 cycles Neuro re-ed: 10 min  -Yamini disc lunge hold: BL, 2x30 sec  -Bosu wide stance hold w/ head rotations: 2x30 sec  -airex balance beam tandem walk forward: x5 cycles  -SLB w/ 3 cone taps: 2x to fatigue Neuro re-ed: 10 min  -tandem balance on airex: 2x30 sec each  -Yamini disc lunge hold: BL, 2x30 sec  -airex balance beam tandem walk forward: x5 cycles  -SLB w/ 3 cone taps: 2x to fatigue-hold out of time           HEP:   Access Code: [email or web address]  URL: [email or web address]  Date: 10/22/2024  Prepared by: Nohemi Carrasquillo     Exercises  - Gastroc Stretch on Step  - 3 x daily - 7 x weekly - 3 sets - 20-30 seconds hold  - Standing Soleus Stretch on Step  - 3 x daily - 7 x weekly - 3 sets - 20-30 seconds hold  - Sidelying Hip Abduction  - 3 x daily - 5 x weekly - 3 sets - 10 reps  - Beginner Bridge  - 1 x daily - 5 x weekly - 3 sets - 10 reps  - Standing Heel Raises  - 1 x daily - 5 x weekly - 3 sets - 10 reps  - Half Tandem Stance Balance with Head Rotation  - 1 x daily - 5 x weekly - 3 sets - 10 reps  - Single Leg Stance  - 1 x daily - 5 x weekly - 3 sets - 30 seconds hold  - Single Leg Balance with Clock Reach  - 1 x daily - 5 x weekly - 2 sets     Charges: TherEx: 1 units, Manual tech: 1 unit, neuro re-ed: 1 unit     Total Timed Treatment: 45 min              Total Treatment Time: 45 min

## 2024-12-10 ENCOUNTER — OFFICE VISIT (OUTPATIENT)
Dept: PHYSICAL THERAPY | Age: 59
End: 2024-12-10
Attending: PODIATRIST
Payer: COMMERCIAL

## 2024-12-10 PROCEDURE — 97140 MANUAL THERAPY 1/> REGIONS: CPT | Performed by: PHYSICAL THERAPIST

## 2024-12-10 PROCEDURE — 97112 NEUROMUSCULAR REEDUCATION: CPT | Performed by: PHYSICAL THERAPIST

## 2024-12-10 PROCEDURE — 97110 THERAPEUTIC EXERCISES: CPT | Performed by: PHYSICAL THERAPIST

## 2024-12-12 ENCOUNTER — OFFICE VISIT (OUTPATIENT)
Dept: FAMILY MEDICINE CLINIC | Facility: CLINIC | Age: 59
End: 2024-12-12
Payer: COMMERCIAL

## 2024-12-12 ENCOUNTER — LAB ENCOUNTER (OUTPATIENT)
Dept: LAB | Age: 59
End: 2024-12-12
Attending: FAMILY MEDICINE
Payer: COMMERCIAL

## 2024-12-12 VITALS
TEMPERATURE: 97 F | DIASTOLIC BLOOD PRESSURE: 82 MMHG | OXYGEN SATURATION: 98 % | BODY MASS INDEX: 37.61 KG/M2 | WEIGHT: 207 LBS | RESPIRATION RATE: 16 BRPM | HEIGHT: 62.21 IN | SYSTOLIC BLOOD PRESSURE: 114 MMHG | HEART RATE: 82 BPM

## 2024-12-12 DIAGNOSIS — M17.0 BILATERAL PRIMARY OSTEOARTHRITIS OF KNEE: ICD-10-CM

## 2024-12-12 DIAGNOSIS — Z12.31 ENCOUNTER FOR SCREENING MAMMOGRAM FOR MALIGNANT NEOPLASM OF BREAST: ICD-10-CM

## 2024-12-12 DIAGNOSIS — Z00.01 ENCOUNTER FOR GENERAL ADULT MEDICAL EXAMINATION WITH ABNORMAL FINDINGS: ICD-10-CM

## 2024-12-12 DIAGNOSIS — E66.01 CLASS 2 SEVERE OBESITY DUE TO EXCESS CALORIES WITH SERIOUS COMORBIDITY AND BODY MASS INDEX (BMI) OF 37.0 TO 37.9 IN ADULT (HCC): ICD-10-CM

## 2024-12-12 DIAGNOSIS — R73.03 PREDIABETES: ICD-10-CM

## 2024-12-12 DIAGNOSIS — E78.2 MIXED HYPERLIPIDEMIA: ICD-10-CM

## 2024-12-12 DIAGNOSIS — Z00.01 ENCOUNTER FOR GENERAL ADULT MEDICAL EXAMINATION WITH ABNORMAL FINDINGS: Primary | ICD-10-CM

## 2024-12-12 DIAGNOSIS — Z23 NEED FOR VACCINATION: ICD-10-CM

## 2024-12-12 DIAGNOSIS — E66.812 CLASS 2 SEVERE OBESITY DUE TO EXCESS CALORIES WITH SERIOUS COMORBIDITY AND BODY MASS INDEX (BMI) OF 37.0 TO 37.9 IN ADULT (HCC): ICD-10-CM

## 2024-12-12 DIAGNOSIS — F31.9 BIPOLAR AFFECTIVE DISORDER, REMISSION STATUS UNSPECIFIED (HCC): ICD-10-CM

## 2024-12-12 LAB
ALBUMIN SERPL-MCNC: 4.3 G/DL (ref 3.2–4.8)
ALBUMIN/GLOB SERPL: 1.2 {RATIO} (ref 1–2)
ALP LIVER SERPL-CCNC: 89 U/L
ALT SERPL-CCNC: 36 U/L
ANION GAP SERPL CALC-SCNC: 9 MMOL/L (ref 0–18)
AST SERPL-CCNC: 27 U/L (ref ?–34)
BASOPHILS # BLD AUTO: 0.04 X10(3) UL (ref 0–0.2)
BASOPHILS NFR BLD AUTO: 0.6 %
BILIRUB SERPL-MCNC: 0.3 MG/DL (ref 0.2–1.1)
BUN BLD-MCNC: 13 MG/DL (ref 9–23)
CALCIUM BLD-MCNC: 9.9 MG/DL (ref 8.7–10.4)
CHLORIDE SERPL-SCNC: 103 MMOL/L (ref 98–112)
CHOLEST SERPL-MCNC: 249 MG/DL (ref ?–200)
CO2 SERPL-SCNC: 27 MMOL/L (ref 21–32)
CREAT BLD-MCNC: 0.74 MG/DL
EGFRCR SERPLBLD CKD-EPI 2021: 92 ML/MIN/1.73M2 (ref 60–?)
EOSINOPHIL # BLD AUTO: 0.32 X10(3) UL (ref 0–0.7)
EOSINOPHIL NFR BLD AUTO: 4.9 %
ERYTHROCYTE [DISTWIDTH] IN BLOOD BY AUTOMATED COUNT: 14.2 %
EST. AVERAGE GLUCOSE BLD GHB EST-MCNC: 120 MG/DL (ref 68–126)
FASTING PATIENT LIPID ANSWER: YES
FASTING STATUS PATIENT QL REPORTED: YES
GLOBULIN PLAS-MCNC: 3.7 G/DL (ref 2–3.5)
GLUCOSE BLD-MCNC: 92 MG/DL (ref 70–99)
HBA1C MFR BLD: 5.8 % (ref ?–5.7)
HCT VFR BLD AUTO: 45.2 %
HDLC SERPL-MCNC: 57 MG/DL (ref 40–59)
HGB BLD-MCNC: 14.6 G/DL
IMM GRANULOCYTES # BLD AUTO: 0.01 X10(3) UL (ref 0–1)
IMM GRANULOCYTES NFR BLD: 0.2 %
LDLC SERPL CALC-MCNC: 159 MG/DL (ref ?–100)
LYMPHOCYTES # BLD AUTO: 2.86 X10(3) UL (ref 1–4)
LYMPHOCYTES NFR BLD AUTO: 44.2 %
MCH RBC QN AUTO: 30.5 PG (ref 26–34)
MCHC RBC AUTO-ENTMCNC: 32.3 G/DL (ref 31–37)
MCV RBC AUTO: 94.6 FL
MONOCYTES # BLD AUTO: 0.54 X10(3) UL (ref 0.1–1)
MONOCYTES NFR BLD AUTO: 8.3 %
NEUTROPHILS # BLD AUTO: 2.7 X10 (3) UL (ref 1.5–7.7)
NEUTROPHILS # BLD AUTO: 2.7 X10(3) UL (ref 1.5–7.7)
NEUTROPHILS NFR BLD AUTO: 41.8 %
NONHDLC SERPL-MCNC: 192 MG/DL (ref ?–130)
OSMOLALITY SERPL CALC.SUM OF ELEC: 288 MOSM/KG (ref 275–295)
PLATELET # BLD AUTO: 339 10(3)UL (ref 150–450)
POTASSIUM SERPL-SCNC: 4.1 MMOL/L (ref 3.5–5.1)
PROT SERPL-MCNC: 8 G/DL (ref 5.7–8.2)
RBC # BLD AUTO: 4.78 X10(6)UL
SODIUM SERPL-SCNC: 139 MMOL/L (ref 136–145)
TRIGL SERPL-MCNC: 182 MG/DL (ref 30–149)
TSI SER-ACNC: 1.65 UIU/ML (ref 0.55–4.78)
VLDLC SERPL CALC-MCNC: 36 MG/DL (ref 0–30)
WBC # BLD AUTO: 6.5 X10(3) UL (ref 4–11)

## 2024-12-12 PROCEDURE — 3008F BODY MASS INDEX DOCD: CPT | Performed by: FAMILY MEDICINE

## 2024-12-12 PROCEDURE — 3074F SYST BP LT 130 MM HG: CPT | Performed by: FAMILY MEDICINE

## 2024-12-12 PROCEDURE — 83036 HEMOGLOBIN GLYCOSYLATED A1C: CPT

## 2024-12-12 PROCEDURE — 3079F DIAST BP 80-89 MM HG: CPT | Performed by: FAMILY MEDICINE

## 2024-12-12 PROCEDURE — 84443 ASSAY THYROID STIM HORMONE: CPT

## 2024-12-12 PROCEDURE — 99396 PREV VISIT EST AGE 40-64: CPT | Performed by: FAMILY MEDICINE

## 2024-12-12 PROCEDURE — 85025 COMPLETE CBC W/AUTO DIFF WBC: CPT

## 2024-12-12 PROCEDURE — 90656 IIV3 VACC NO PRSV 0.5 ML IM: CPT | Performed by: FAMILY MEDICINE

## 2024-12-12 PROCEDURE — 36415 COLL VENOUS BLD VENIPUNCTURE: CPT

## 2024-12-12 PROCEDURE — 90471 IMMUNIZATION ADMIN: CPT | Performed by: FAMILY MEDICINE

## 2024-12-12 PROCEDURE — 80053 COMPREHEN METABOLIC PANEL: CPT

## 2024-12-12 PROCEDURE — 80061 LIPID PANEL: CPT

## 2024-12-12 NOTE — PATIENT INSTRUCTIONS
Why is everyone talking about the Glucose Goddess hacks?  The Glucose Goddess.  Navigating your day with sustained energy and sharp focus is all about smart blood glucose control.  The following glucose hacks are small yet effective changes that will transform how you eat, directly inspired by Kassidy Travis, the acclaimed  and “Glucose Goddess”.    With over 1.5 million followers rallying behind her Glucose Goddess movement and her international bestseller, “Glucose Revolution,” Kassidy is making blood sugar management understandable and achievable for people everywhere.    Glucose hacks can help you with  Improved energy levels  Weight management  Reduced chronic disease risk  Enhanced cognitive function  Better sleep quality  Improved exercise performance  Improved nutrition  Emotional stability  So without wasting any more time, let’s learn about the Glucose Goddess hacks.    1. Eat foods in the right order  Hack 1: Eat foods in the right order  Think of your meal as a queue where everyone - or in this case, every nutrient - should wait for its turn. Begin with your veggies, followed by fats and proteins, and opt for sugars and starches last.  Kicking off with vegetables and proteins is a clever move because they line up your digestive system for a slower glucose release.  In a nutshell, the vegetables act like the bouncers of your gut, letting sugars in slowly thanks to their fibre.  Proteins then enter the fray, demanding more attention from your digestive enzymes, which slows down the rush of carbohydrates that may come next.  By pacing your plate with veggies and proteins first, you’re dialling down the speed of sugar absorption  This trick not only helps dodging sugar spikes; but also allows your hormones like insulin to play in your favour, helping with weight management and keeping you full longer.  Eating in the right order will lead to steady energy levels and a daily metabolic cycle aligned  with your routine.    2: Let the starters be veggies  Hack 2: Veggie starters  When it comes to calories, think of them as guests at a party. The ones from fructose can be a bit like gatecrashers - they tend to overstay their welcome and cause more trouble, especially when compared to their glucose counterparts. So how do you host this party right?     The easy trick is to begin with the veggies as starters. These are the guests that improve the vibe from the get-go, mingling well with others (e.g. your metabolism) and setting a chill tone for the evening (or in this case, your blood sugar levels).    It’s not just about their ability to make the party look bigger and better; it’s what they carry with them - an abundance of nutrients and fibres that prep your system to handle whatever comes next[2].     With this hack, you’re preparing for a smooth ride on the glucose curve. Think of them as the warm-up act to your meal, setting the stage for nutritional success. This way, you can enjoy the foods you love, whilst still nourishing your body.    3: Go for a savoury breakfast  Hack 3: Savoury breakfast -Opting for a savoury breakfast is particularly beneficial after the overnight fast your body undergoes during sleep. Upon waking, your body is highly sensitive to insulin, and introducing sugars early can cause an abrupt spike in insulin levels, leading to rapid fluctuations in blood sugar.   These spikes are harmful. They can not only lead to energy crashes later on, but also trigger increased hunger and cravings throughout the day.  A breakfast focused on protein, fat, and fibre aids in a slow, steady rise in blood glucose and insulin levels[3].   This more gradual increase is conducive to maintaining energy levels, improving satiety, and regulating appetite, setting a stable metabolic tone for the day ahead.  Even the addition of whole fruits should be modest, as the goal is to keep the sweet taste without making it  the centrepiece that could potentially lead to a glucose spike.    4: All sugars are equal-  Caitlyn 4: All sugars are equal- When it comes to sugar, your body sees all types as equals. Whether it’s honey, brown sugar, syrups, or white table sugar, they all break down into glucose and fructose.  There’s a common misconception that some sugars are healthier than others, like sucralose or high-fructose corn syrup, but the reality is, excess intake of any sugar can be detrimental to health.   Once consumed, all sugars break down and impact your blood sugar levels relatively the same way.  Whilst some natural sweeteners contain trace nutrients, the amount of sugar you’d have to consume to gain any health benefit would far outweigh the nutritional value.   The bottom line is that moderation is crucial, regardless of the type of sugar. The focus should be on limiting intake rather than choosing a supposedly healthier option, as too much of any sugar can contribute to health issues such as weight gain, blood sugar imbalances, and increased risk of chronic diseases.    5: Desserts are better than snacks  Caitlyn 5: Dessert over snack  Indulging in a dessert rather than reaching for a sweet snack in between meals, can be a strategic choice for managing blood sugar levels.  When you enjoy a sweet treat as a standalone snack, there’s nothing to buffer the sugar as it enters your bloodstream, leading to a ponce rise in glucose levels.  When you have that same treat after a balanced meal, the other nutrients present--proteins, fats, and fibres--act as a moderating influence.   They slow down the digestion and absorption of the sugar, leading to a more gradual increase in blood sugar and insulin.  This doesn’t just help in maintaining steadier energy levels; it can also prevent the quick spike-and-crash cycle that can leave you feeling tired and hungry soon after a sugar-laden snack.  So, if you’re going to treat yourself to something  sweet, doing it after a meal is a more blood sugar-friendly approach.    6: Start appreciating vinegar more  Hagarcia 6: Vinegar -Vinegar’s acetic acid is a powerful substance in the quest for balanced blood sugar.  Incorporating just a tablespoon of vinegar into your diet--perhaps mixed into a glass of water or used as a salad dressing--before meals can make a significant difference.  Scientific studies suggest that vinegar can reduce the glycemic impact of a meal by up to 20%, dampening the post-meal blood sugar spike[4].   This effect is particularly beneficial for those looking to manage their glycemic response after eating. The acetic acid in vinegar works by influencing the rate of gastric emptying and enhancing the uptake of glucose by bodily tissues, which together, help in moderating blood sugar levels.  Vinegar may also improve insulin sensitivity, making your body’s response to sugar more efficient. This ancient kitchen staple, holds modern-day relevance for anyone mindful of their metabolic health.    7: Move your body after eating  Caitlyn 7: Post meal movement  Engaging in light physical activity after eating does more than just burn calories: it activates crucial physiological responses that help balance blood sugar levels.  When you exercise, your muscles contract and this movement signals them to take up more glucose from the bloodstream to use as fuel.   This process occurs independently of insulin, which is particularly beneficial immediately after a meal when glucose levels are at their highest.  Even a short period of movement, such as a 10-minute walk, doing dishes, or some light stretching, encourages your muscles to use glucose for energy, thus lowering the amount that remains in your bloodstream.   Over time, your body becomes better at naturally regulating blood sugar levels. Remember, the activity doesn’t have to be intense; it’s the act of moving that counts.     8: If you want snacks, get the  savoury ones  You’re craving snacks in between work, but do not want to have a heavy meal? Opting for savoury snacks will be particularly advantageous to sidestep the less favourable effects of sweet snacks.  Whilst fruits can offer a sweet taste coupled with beneficial fibres and nutrients, other common sweet snacks often come packed with added sugars and refined carbohydrates.   These can cause rapid blood sugar spikes followed by sharp declines, leading to a cycle of energy dips and renewed cravings.  Savoury snacks could be vegetables, nuts, seeds, and whole grains that bring substantial protein and healthy fats to the table.   These nutrients embark on a slower digestion process, providing a stable release of energy and keeping blood sugar levels on an even keel.  Whether it’s a crunchy serving of raw veggies with hummus or a handful of almonds, turning to savoury options can support your blood sugar management, whilst also contributing to overall nutrient intake.    9: Don’t eat your carbs naked  Clothes over carbs  Pairing carbohydrates with protein, fat, or fibre is like giving them a  that moderates their effect on your blood sugar.  This combination approach is damico to slowing down the absorption of glucose into your bloodstream.  When you eat carbs alone, they’re typically digested quickly, leading to that all-too-familiar sugar rush and subsequent crash.  But when you ‘clothe’ your carbs with other nutrients, you’re essentially creating a time-release system for glucose.  Proteins and fats take longer to digest, which means the glucose from any accompanying carbohydrates also enters your bloodstream more gradually.  Fibre acts as a regulator, forming a gel-like substance in the gut that slows down the whole process. By adding these ‘clothes’ to your carbs, you’re not just dressing up your meal, you’re prepping your body for a steadier, more sustained energy release.    10: Stop counting  calories  Stop calorie counting  the practice of counting calories for better health is an oversimplification of a much more complex nutritional landscape.  It’s becoming increasingly clear that not all calories wield the same effects on health and metabolic outcomes.  200 calories of vegetables, lean protein, or whole grains, for example, will affect your body differently than 200 calories of sugary snacks.  Some calories may deliver essential nutrients and compounds that contribute to satiety, energy levels, and overall metabolic health, whilst others can cause blood sugar levels to spike and crash, possibly leading to increased hunger and overeating.  Consider calories derived from fructose and those from glucose. Whilst both are sugars that contribute to your daily caloric intake, their influence on the body is quite different.  Fructose, particularly in its processed forms like high-fructose corn syrup, can be metabolically taxing, often leading to insulin resistance and fatty liver when consumed in excess.  On the other hand, glucose is a primary energy source that your body can handle more efficiently. Shift your emphasis from quantity to quality of the calories consumed.    The nutritional value and source of the calories are the most important.    A calorie is not just a calorie; it is a potential energy source, and its impact is contingent on its interaction with your body’s intricate metabolic processes.    Rather than counting calories, it would be more beneficial to consider the whole nutritional profile of foods, choosing those that offer more fibres, proteins, and healthy fats, which are known to have positive effects on satiety, blood sugar levels, and overall health.

## 2024-12-12 NOTE — PROGRESS NOTES
Family Medicine Progress Note  ASSESSMENT & PLAN  Louann Castillo is a 59 year old female who is here for:       1. Encounter for general adult medical examination with abnormal findings  Wellness Exam done today and routine Preventative Care discussed as noted below.   -Pap smear:  09/11/2023 - up-to-date   -Mammogram:  due   -CRC Screen:  up-to-date - due next in 2028   -PHQ2- Negative   -Encouraged healthy eating habits and Routine Exercise.   -Bone health: Ca-Vit D supplementation Rec  -Screening labs ordered and will be released via [facility].   -Recommend Annual Well Woman Exams.    - CBC With Differential With Platelet; Future  - Comp Metabolic Panel (14); Future  - TSH W Reflex To Free T4; Future  - Lipid Panel; Future    2. Prediabetes- need to recheck. Did disucss weight loss.   - Hemoglobin A1C [E]; Future    3. Bipolar affective disorder, remission status unspecified (HCC)-chronic, managed by psych, no acute concerns.         4. Mixed hyperlipidemia-due for repeat labs currently on simvastatin.  No adverse side effects-due for repeat labs, currently on simvastatin.  No side effects    5. Class 2 severe obesity due to excess calories with serious comorbidity and body mass index (BMI) of 37.0 to 37.9 in adult (HCC)- Pt is a candidate for medication assisted weight loss therapy based on BMI and Comorbidities.  (BMI >30 with [id number], HLD,.   - Bloodwork- DUE   - - Discussed BMI   - Discussion about Intermittent Fasting--- how to implement into life and the pathophys   -Glucose Revolution and working on cutting out processed protein options  - Eat >4-5 servings of fruits/vegetables per day.   - Lean protein and Dairy is best (1% or skim)  - Maximize Water intake   - Recommend >150m of vigorous activity.  Can be spaced 5d/wk or weekend warrior-still will get benefit.   - Minimize eating fast food  - Did discuss medication assisted weight loss--     6. Encounter for screening mammogram for malignant  neoplasm of breast  - [facility] HARRY 2D+3D SCREENING BILAT (CPT=77067/95535); Future    7. Need for vaccination  Immunizations discussed;  Specifically I discussed the purpose, benefit, adverse reactions and side effects of the following vaccinations:    - Zoster Vac (Shingrix) in office vaccine- Non-Medicare or Medicare with ABN  - INFLUENZA VACCINE, TRI, PRESERV FREE, 0.5 ML    8. Bilateral primary osteoarthritis of knee-  limts her activity.        Follow-Up: Return in about 2 months (around 2025) for FU Weight loss.      Nirali Jeffers, DO   24      CC: Physical    SUBJECTIVE   History of Present Illness:  History obtained from patient.        ANNUAL PHYSICAL  Patient is post-menopausal--Denies vaginal bleeding or concerning vaginal symptoms  Cervical Cancer Screening- up-to-date    PMH of Abnormal Pap: denies   Breast Cancer Screening: Due  Colon Cancer Screenin/10/2018- Repeat in 10y     Exercise: generally tries to be active--- but limited in formal exercise given joint pains.    Tobacco/Alcohol: denies tobacco;  occ Alcohol.       Well-rounded- - fruit/vegetables-- does take MVN; Avoids eating out/fried foods;  Making a point to not snack. Protein bar AM, protein shake-lunch/ crackers-cream cheese/raisons; Dinner- beef stroganoff, chicken jaimes; Does eat spinach  B-Protein Bar   L- may or may not eat lunch   D- CPK pizza, Chicekn Parm, Mac and Cheese, TomatO  Diests since she was 19 yo.   Weight watchers;    Wants to loose weight but feels her efforts are futile.    Would be open to medications.        Was seen at  for ankle- Retrocalcaneal Bursitis- wokring with Dr. Wilson.   Bilateral Knee OA- did impact her in [place]--- walking daily.        [id number]-3.1%  HLD- 4.8% restarted her statin   Bipolar II/FRITZ/MDD-Dr. Echeverria- Psych --Oxcarbazepine, Venlafaxine, Trazodone.   Exercise/Stress Induced & Allergic Rhinitis- can't breathe through her nose;  hasn't had recurrent Sinusitis since being on  Singulair.    Pshx: Cholecystectomy, CSPY, Hymenotomy; Hip SX  All: Wellbutrin; Lamictal; PCN   Meds: as below  Fam hx:  RCC-F; Breast Ca-Max2; CRC-MGF, CVA-MGM; Heart-M, MGM;  Psych-m, mgf  Soc hx: no tob/occ etoh/no illicits; ; Retired;   Ob/gyne: Post-melissa . last pap: -09/11/2023, last mamm- 10/27/2023       Problem List:  Patient Active Problem List   Diagnosis    Abnormal mammogram    Akathisia    Allergic rhinitis    Bipolar disorder (HCC)    Hypertriglyceridemia    Mixed hyperlipidemia    Obesity with body mass index 30 or greater    Class 2 severe obesity due to excess calories with serious comorbidity and body mass index (BMI) of 37.0 to 37.9 in adult (HCC)    Bilateral primary osteoarthritis of knee       Historical Information   Past Medical History  Past Medical History:    Allergic rhinitis    All year around, indoor & outdoor    Anxiety    Arthritis    Asthma (HCC)    Life long, not diagnosed until 1990s    Bipolar 2 disorder (HCC)    Calculus of kidney    3 incidents, last one in mid 1990s    Depression    Diagnosed 1994    Esophageal reflux    Hyperlipidemia    Kidney stones    Seasonal allergies     Past Surgical History  Past Surgical History:   Procedure Laterality Date    Cholecystectomy  2012    Full surgery with complications. 9 days in patients    Colonoscopy  2018    Hip surgery      left hip labral tear    Hymenotomy simple incision      Other surgical history  2010s    Hip tear     Family History  Family History   Problem Relation Age of Onset    Depression Mother         Denial    Heart Disorder Mother         Pacemaker in late 80s    Lipids Mother         High triglycerides    Musculo-skelatal Disorder Mother         Osteoarthritis    Psychiatric Mother     Cancer Father         Kidney    Heart Disorder Maternal Grandmother         Angina    Stroke Maternal Grandmother     Cancer Maternal Grandfather         Naif    Depression Maternal Grandfather         Hospitalzed at least  twice, received shock treatment in the 1930s    Psychiatric Maternal Grandfather     Cancer Paternal Grandmother     Breast Cancer Maternal Aunt 75        mid to late 70's    Breast Cancer Maternal Aunt 60     Social History  Social History     Socioeconomic History    Marital status:    Tobacco Use    Smoking status: Never    Smokeless tobacco: Never    Tobacco comments:     Life time 3 cigarettes   Vaping Use    Vaping status: Never Used   Substance and Sexual Activity    Alcohol use: Yes     Alcohol/week: 4.0 standard drinks of alcohol     Types: 1 Glasses of wine, 2 Cans of beer, 1 Shots of liquor per week     Comment: occasionally    Drug use: Not Currently     Types: Cannabis     Comment: Last use 1979   Other Topics Concern    Caffeine Concern No    Exercise No    Seat Belt No    Weight Concern Yes    Special Diet No    Stress Concern No      Social History     Social History Narrative    Not on file      Medications   Current Outpatient Medications   Medication Sig Dispense Refill    pantoprazole 20 MG Oral Tab EC Take 1 tablet (20 mg total) by mouth before breakfast. 90 tablet 0    montelukast 10 MG Oral Tab Take 1 tablet (10 mg total) by mouth daily. 90 tablet 0    simvastatin 20 MG Oral Tab Take 1 tablet (20 mg total) by mouth nightly. 90 tablet 3    clobetasol 0.05 % External Ointment Apply to the affected area on the Vulva nightly for 4 wks 45 g 0    traZODone 150 MG Oral Tab Take 1 tablet (150 mg total) by mouth nightly as needed for Sleep.      OXcarbazepine 300 MG Oral Tab Take 1 tablet (300 mg total) by mouth daily.      venlafaxine ER 75 MG Oral Capsule SR 24 Hr Take 1 capsule (75 mg total) by mouth daily.      Elastic Bandages & Supports (ACE ANKLE BRACE) Does not apply Misc Please provide patient with a CAM/walking boot. 1 each 0    cetirizine 1 MG/ML Oral Solution Take 5 mL (5 mg total) by mouth daily.      Venlafaxine HCl  MG Oral Capsule SR 24 Hr Take 1 capsule (150 mg total) by  mouth 2 (two) times daily.        Allergies   Allergies   Allergen Reactions    Other UNKNOWN    Wellbutrin [Bupropion] OTHER (SEE COMMENTS)     Skin breaks out when touched    Lamictal [Lamotrigine] RASH    Penicillins RASH          OBJECTIVE   VITALS: /82   Pulse 82   Temp 97.1 °F (36.2 °C) (Temporal)   Resp 16   Ht 5' 2.21\" (1.58 m)   Wt 207 lb (93.9 kg)   SpO2 98%   BMI 37.61 kg/m²  No LMP recorded. Patient is postmenopausal.    Wt Readings from Last 6 Encounters:   12/12/24 207 lb (93.9 kg)   06/11/24 207 lb (93.9 kg)   05/25/24 207 lb (93.9 kg)   10/10/23 199 lb (90.3 kg)   09/11/23 201 lb (91.2 kg)   04/18/23 202 lb (91.6 kg)     BP Readings from Last 3 Encounters:   12/12/24 114/82   05/25/24 (!) 169/90   10/10/23 124/82       PHYSICAL EXAM  GEN: pleasant, well-appearing in NAD, AOX3  SKIN: no visible rashes, lesions, or evidence of trauma  HEENT: PERRL, EOMI, moist mucous membranes, oropharynx clear, TM clear, nares patent, no Thyromegaly or nodule.   CV: RRR, no murmurs or abnl heart sounds   PULM: Clear to auscultation, No wheezes, rales, rhonchi.  Non-labored breathing.  ABD: Soft, non-tender, non-distended, + BS, no rigidity/guarding  EXT:  Warm, well perfused, no lower extremity edema  NEURO: CNs grossly intact, no focal weakness  MSK: moves all 4 extremities without difficulty; RT calf/ankle tightness;  Gait is antalgic from knee pain.   PSYCH: mood and affect are appropriate        Lab Results   Component Value Date     12/12/2024    A1C 5.8 (H) 12/12/2024     Lab Results   Component Value Date/Time    CHOLEST 249 (H) 12/12/2024 11:13 AM    TRIG 182 (H) 12/12/2024 11:13 AM    HDL 57 12/12/2024 11:13 AM     (H) 12/12/2024 11:13 AM    NONHDLC 192 (H) 12/12/2024 11:13 AM       Lab Results   Component Value Date    WBC 6.5 12/12/2024    RBC 4.78 12/12/2024    HGB 14.6 12/12/2024    HCT 45.2 12/12/2024    MCV 94.6 12/12/2024    MCH 30.5 12/12/2024    MCHC 32.3 12/12/2024    RDW  14.2 12/12/2024    .0 12/12/2024      Lab Results   Component Value Date    GLU 92 12/12/2024    BUN 13 12/12/2024    CREATSERUM 0.74 12/12/2024    ANIONGAP 9 12/12/2024    CA 9.9 12/12/2024    OSMOCALC 288 12/12/2024    ALKPHO 89 12/12/2024    AST 27 12/12/2024    ALT 36 12/12/2024    BILT 0.3 12/12/2024    TP 8.0 12/12/2024    ALB 4.3 12/12/2024    GLOBULIN 3.7 (H) 12/12/2024     12/12/2024    K 4.1 12/12/2024     12/12/2024    CO2 27.0 12/12/2024              Nirali Jeffers DO  12/12/24

## 2024-12-19 ENCOUNTER — OFFICE VISIT (OUTPATIENT)
Dept: PHYSICAL THERAPY | Age: 59
End: 2024-12-19
Attending: PODIATRIST
Payer: COMMERCIAL

## 2024-12-19 PROCEDURE — 97112 NEUROMUSCULAR REEDUCATION: CPT

## 2024-12-19 PROCEDURE — 97110 THERAPEUTIC EXERCISES: CPT

## 2024-12-19 PROCEDURE — 97140 MANUAL THERAPY 1/> REGIONS: CPT

## 2024-12-19 NOTE — PROGRESS NOTES
Discharge Summary  Pt has attended 15 visits in Physical Therapy.   Diagnosis:   Retrocalcaneal bursitis (back of heel), right (M77.51)           Referring Provider: Sangita Wilson  Date of Evaluation:    8/20/24     PN on 10/22/24    Precautions:  None Next MD visit:   none scheduled  Date of Surgery: n/a   Insurance Primary/Secondary: [place] / N/A     # Auth Visits: 15 visits till 12/31/24             Subjective: Pt reports her heel is a bit bothered today     Pain:   4/10 upon arrival       Objective:      Palpation: TTP at lateral achilles insertion and lateral retro calcaneal bursa--> improved      AROM: (* denotes performed with pain)  Foot/Ankle   DF: R 5; L 5  PF: R WNL*; L WNL  INV: R WNL; L WNL  EV: R WNL; L WNL            Flexibility:  Gastroc-soleus: R +; L +--> improved      Strength/MMT: (* denotes performed with pain)  Hip Knee Foot/Ankle   Flexion: R 4-/5; L 4-/5  Extension: R 3+/5; L 3+/5  Abduction: R 3+/5; L 3+/5  ER: R 4/5; L 4/5    Flexion: R 5/5; L 5/5  Extension: R 5/5; L 4/5    DF: R 5/5; L 5/5  PF: R 5/5; L 5/5  INV: R 5/5; L 5/5  EV: R 5/5; L 5/5            Gait: pt ambulates on level ground with normal mechanics     Balance:   SLS: R 35 sec, L 10 sec        Assessment: Pt has been seen in skilled PT for 15 visits since her initial evaluation on 8/20/2024. Objectively, patient has shown improvements in L ankle DF ROM, ankle strength in all directions, and balance on the LLE. Pt continues to have limitations in LLE balance compared to the RLE. These objective improvements have allowed the patient to perform light lifting around the house as well as some prolonged walking. Their objective deficits continue to limit their ability to perform walking over 2 mi as well as deep squatting for items on the floor.  Patient has met most goals and is set to be d/c and continue progressing w/ her updated home program.        Goals:   (to be met in 15 visits)  Pt will demonstrate improved DF  AROM to >= 8 degrees to promote proper foot clearance during gait and greater ease descending stairs without compensation-Progressing   Pt will have increased ankle strength to 5/5 throughout for improved ankle control with ADLs such as prolonged gait and stair negotiation -MET  Pt will have improved SLS to >10s on airex for increased ankle stability with ambulation on uneven surfaces such as gravel and grass- MET   Pt will report <2/10 pain with work and home activities such as taking her dog for a 1.5 mile walk.-Progressing     Pt will be independent and compliant with comprehensive HEP to maintain progress achieved in PT-on going           LEFS Score  LEFS Score: 61.25 % (8/19/2024 12:12 PM)    Post LEFS Score  Post LEFS Score: (Patient-Rptd) 68.75 % (12/19/2024 12:45 PM)    7.5 % improvement     Plan: D/C w/ updated HEP     Patient/Family/Caregiver was advised of these findings, precautions, and treatment options and has agreed to actively participate in planning and for this course of care.    Thank you for your referral. If you have any questions, please contact me at Dept: [phone].    Sincerely,  Electronically signed by therapist: Palomo Soler PTA     Physician's certification required:  No  Please co-sign or sign and return this letter via fax as soon as possible to [phone].   I certify the need for these services furnished under this plan of treatment and while under my care.    X___________________________________________________ Date____________________    Certification From: 12/19/2024  To:3/19/2025        Date:11/6/24   Tx#: 11/15  (Per M.P) Date:11/15/24   Tx#: 12/15  (Per M.P) Date:11/20/24   Tx#: 13/15  (Per M.P) Date:12/10/24   Tx#: 14/15  (Per M.P) Date: 12/19/24  Tx# 15/15   Manual tech: 15 min  -STM/IASTM to (R) gastroc and achilles  -sub talar mobs med/lat, level III  -Gastoc stretches w/ heel assist Manual tech: 17 min  -STM/IASTM to (R) gastroc and achilles  -sub talar mobs  med/lat, level III  -Gastoc stretches w/ heel assist Manual tech: 15 min  -STM/IASTM to (R) gastroc and achilles  -sub talar mobs med/lat, level III  -Gastoc stretches w/ heel assist Manual tech: 20 min  -STM/IASTM to (R) gastroc and achilles  -sub talar mobs med/lat, level III  -Gastoc stretches w/ heel assist Manual tech: 20 min  -STM/IASTM to (R) gastroc and achilles  -sub talar mobs med/lat, level III  -Gastoc stretches w/ heel assist   TherEx: 20 min  -NuStep: 6 min  -PROM (R) ankle  -calf stretch on step start and end of tx: 2x30 sec   -Eccentric heel raises: up w/ 2, down w/ 1: 2x10  -DL heel raise on 1/2 FR: 2x to fatigue  -SL RDL's: 2x10  -standing hip ext and abductions: RTB, 2x10 each leg           Hold  SL heel raise hold at bar TherEx: 15 min  -NuStep: 6 min  -PROM (R) ankle  -calf stretch on step start and end of tx: 2x30 sec   -Eccentric heel raises: up w/ 2, down w/ 1: 2x10  -DL heel raise on 1/2 FR: 2x to fatigue             Hold  SL heel raise hold at bar TherEx: 20 min  -NuStep: 6 min  -PROM (R) ankle  -calf stretch on step start and end of tx: 2x30 sec   -Eccentric heel raises: up w/ 2, down w/ 1: 2x10  -DL heel raise on 1/2 FR: 2x to fatigue             Hold  SL heel raise hold at bar TherEx: 15 min  -NuStep: 7 min  -PROM (R) ankle  -calf stretch on step start and end of tx: 2x30 sec   -Eccentric heel raises: up w/ 2, down w/ 1: 2x10-hold  -DL heel raise on 1/2 FR: 2x to fatigue             Hold  SL heel raise hold at bar TherEx: 15 min  -NuStep: 7 min  - Reassessment  - calf stretch on step start and end of tx: 2x30 sec   - SL heel raise at bar 2 x 10    Neuro re-ed: 10 min  -tandem on airex w/ head rotations: 2x20 each  -SLB 3 way taps: 2 x to fatigue   -Yamini disc lunge hold: BL, 2x30 sec  -Bosu wide stance hold w/ head rotations: 2x30 sec Neuro re-ed: 8 min  -Yamini disc lunge hold: BL, 2x30 sec  -Bosu wide stance hold w/ head rotations: 2x30 sec  -airex balance beam tandem walk forward: x5  cycles Neuro re-ed: 10 min  -Yamini disc lunge hold: BL, 2x30 sec  -Bosu wide stance hold w/ head rotations: 2x30 sec  -airex balance beam tandem walk forward: x5 cycles  -SLB w/ 3 cone taps: 2x to fatigue Neuro re-ed: 10 min  -tandem balance on airex: 2x30 sec each  -Yamini disc lunge hold: BL, 2x30 sec  -airex balance beam tandem walk forward: x5 cycles  -SLB w/ 3 cone taps: 2x to fatigue-hold out of time Neuro re-ed: 10 min  Wobble board balance AP/ML x 60\" ea   - Aeromat tandem stepping x 5 rounds  - Mod SLB w/ blaze pods taps tap 3 ignore 1 x 60\"             HEP:   Access Code: [email or web address]  URL: [email or web address]  Date: 12/19/2024  Prepared by: Palomo Soler    Exercises  - Gastroc Stretch on Step  - 3 x daily - 7 x weekly - 3 sets - 20-30 seconds hold  - Standing Soleus Stretch on Step  - 3 x daily - 7 x weekly - 3 sets - 20-30 seconds hold  - Marching Bridge  - 2 x daily - 7 x weekly - 2 sets - 10 reps - 3 hold  - Sidelying Hip Abduction  - 3 x daily - 5 x weekly - 3 sets - 10 reps  - Standing Heel Raises  - 1 x daily - 5 x weekly - 3 sets - 10 reps  - Single Leg Stance  - 1 x daily - 5 x weekly - 3 sets - 30 seconds hold  - Single Leg Balance with Clock Reach  - 1 x daily - 5 x weekly - 2 sets  - Walking Tandem Stance  - 2 x daily - 7 x weekly - 2 sets - 10 reps - 3 hold    Charges: TherEx: 1 units, Manual tech: 1 unit, neuro re-ed: 1 unit     Total Timed Treatment: 45 min              Total Treatment Time: 45 min

## 2025-01-22 DIAGNOSIS — J30.89 NON-SEASONAL ALLERGIC RHINITIS, UNSPECIFIED TRIGGER: ICD-10-CM

## 2025-01-27 RX ORDER — MONTELUKAST SODIUM 10 MG/1
10 TABLET ORAL DAILY
Qty: 90 TABLET | Refills: 3 | Status: SHIPPED | OUTPATIENT
Start: 2025-01-27

## 2025-01-27 RX ORDER — PANTOPRAZOLE SODIUM 20 MG/1
20 TABLET, DELAYED RELEASE ORAL
Qty: 90 TABLET | Refills: 3 | Status: SHIPPED | OUTPATIENT
Start: 2025-01-27

## 2025-01-27 NOTE — TELEPHONE ENCOUNTER
Please review.  Protocol failed / Has no protocol.     Requested Prescriptions   Pending Prescriptions Disp Refills    MONTELUKAST 10 MG Oral Tab [Pharmacy Med Name: MONTELUKAST 10MG TABLETS] 90 tablet 3     Sig: TAKE 1 TABLET(10 MG) BY MOUTH DAILY       Asthma & COPD Medication Protocol Failed - 1/27/2025  4:31 PM        Failed - ACT Score greater than or equal to 20                Failed - ACT recorded in the last 12 months                Passed - Appointment in past 6 or next 3 months      Recent Outpatient Visits              1 month ago     [facility] - Palomo Bazan PTA    Office Visit    1 month ago Encounter for general adult medical examination with abnormal findings    [facility], [address] [place] [address] Nirali Bartlett, DO    Office Visit    1 month ago     [facility] - Nohemi Mcclelland, PT    Office Visit    2 months ago     [facility] - Nohemi Mcclelland, PT    Office Visit    2 months ago     [facility] - Nohemi Mcclelland, PT    Office Visit          Future Appointments         Provider Department Appt Notes    Tomorrow [facility] [place]                     Passed - Medication is active on med list          PANTOPRAZOLE 20 MG Oral Tab EC [Pharmacy Med Name: PANTOPRAZOLE 20MG TABLETS] 90 tablet 3     Sig: TAKE 1 TABLET(20 MG) BY MOUTH EVERY MORNING BEFORE BREAKFAST       Gastrointestional Medication Protocol Passed - 1/27/2025  4:31 PM        Passed - In person appointment or virtual visit in the past 12 mos or appointment in next 3 mos     Recent Outpatient Visits              1 month ago     [facility] Palomo Garcia PTA    Office Visit    1 month ago Encounter for general adult medical examination with abnormal findings    [facility], [address] [place], [place] [address]Nirali Ferrer,     Office Visit    1 month ago      [facility] - Nohemi Mcclelland, PT    Office Visit    2 months ago     [facility] - Nohemi Mcclelland, PT    Office Visit    2 months ago     [facility] - Nohemi Mcclelland, PT    Office Visit          Future Appointments         Provider Department Appt Notes    Tomorrow [facility] RM1 [facility] [place]                     Passed - Medication is active on med list

## 2025-01-28 ENCOUNTER — HOSPITAL ENCOUNTER (OUTPATIENT)
Dept: MAMMOGRAPHY | Age: 60
Discharge: HOME | End: 2025-01-28
Attending: FAMILY MEDICINE
Payer: COMMERCIAL

## 2025-01-28 DIAGNOSIS — Z12.31 ENCOUNTER FOR SCREENING MAMMOGRAM FOR MALIGNANT NEOPLASM OF BREAST: ICD-10-CM

## 2025-01-28 PROCEDURE — 77067 SCR MAMMO BI INCL CAD: CPT | Performed by: FAMILY MEDICINE

## 2025-01-28 PROCEDURE — 77063 BREAST TOMOSYNTHESIS BI: CPT | Performed by: FAMILY MEDICINE

## 2025-02-25 ENCOUNTER — OFFICE VISIT (OUTPATIENT)
Dept: FAMILY MEDICINE CLINIC | Facility: CLINIC | Age: 60
End: 2025-02-25
Payer: COMMERCIAL

## 2025-02-25 VITALS
HEART RATE: 94 BPM | SYSTOLIC BLOOD PRESSURE: 120 MMHG | TEMPERATURE: 97 F | WEIGHT: 211 LBS | BODY MASS INDEX: 36.92 KG/M2 | OXYGEN SATURATION: 97 % | HEIGHT: 63.5 IN | DIASTOLIC BLOOD PRESSURE: 80 MMHG | RESPIRATION RATE: 16 BRPM

## 2025-02-25 DIAGNOSIS — R73.03 PREDIABETES: ICD-10-CM

## 2025-02-25 DIAGNOSIS — F31.9 BIPOLAR AFFECTIVE DISORDER, REMISSION STATUS UNSPECIFIED (HCC): ICD-10-CM

## 2025-02-25 DIAGNOSIS — E66.01 CLASS 2 SEVERE OBESITY DUE TO EXCESS CALORIES WITH SERIOUS COMORBIDITY AND BODY MASS INDEX (BMI) OF 37.0 TO 37.9 IN ADULT (HCC): Primary | ICD-10-CM

## 2025-02-25 DIAGNOSIS — E78.2 MIXED HYPERLIPIDEMIA: ICD-10-CM

## 2025-02-25 DIAGNOSIS — N89.8 VAGINAL IRRITATION: ICD-10-CM

## 2025-02-25 DIAGNOSIS — E66.812 CLASS 2 SEVERE OBESITY DUE TO EXCESS CALORIES WITH SERIOUS COMORBIDITY AND BODY MASS INDEX (BMI) OF 37.0 TO 37.9 IN ADULT (HCC): Primary | ICD-10-CM

## 2025-02-25 PROCEDURE — 3008F BODY MASS INDEX DOCD: CPT | Performed by: FAMILY MEDICINE

## 2025-02-25 PROCEDURE — 3074F SYST BP LT 130 MM HG: CPT | Performed by: FAMILY MEDICINE

## 2025-02-25 PROCEDURE — 99214 OFFICE O/P EST MOD 30 MIN: CPT | Performed by: FAMILY MEDICINE

## 2025-02-25 PROCEDURE — 3079F DIAST BP 80-89 MM HG: CPT | Performed by: FAMILY MEDICINE

## 2025-02-25 NOTE — PATIENT INSTRUCTIONS
Goal Protein - 95g     Compound semaglutide/tirzepatide is a custom-made medication that mimics the GLP-1 hormone. GLP-1 helps regulate appetite and blood sugar levels by suppressing hunger, slowing down gastric emptying, and promoting a sense of fullness.  Compound semaglutide/tirzepitide may be prescribed when an FDA-approved medication, dose, or dosage form is unavailable.   It is important to note that this is not the same as the FDA approved Zepbound/Wegovy as it is coming from a [facility] compound pharmacy and in turn any the prescribing physician (Dr. Jeffers) is not responsible for any side effects or issues arising from the compounded medication, as its preparation and quality are the sole responsibility of the compounding pharmacy.     Dr. Jeffers uses [facility] for compounded Semaglutide/Tirzepatide.  After ordering the prescription, [facility] should contact you within a few days to set up an account. If you do not hear from them, please contact them at the phone number below. The cost for this compound medication can be between $80 and $250, depending on dose.     [facility]  [address]  [place] [address]  Phone: [phone]  Fax: [phone]   Hours  Pharmacy: Mon - Fri 7:30AM - 7:30PM  Call Center: Mon - Fri 7:00AM - 7:00PM    
[FreeTextEntry2] : right humerus 
[FreeTextEntry2] : right humerus

## 2025-02-25 NOTE — PROGRESS NOTES
Family Medicine Progress Note  ASSESSMENT & PLAN  Follow-Up: Return in about 3 months (around 5/25/2025) for FU Weight loss.       Assessment & Plan  Class 2 severe obesity due to excess calories with serious comorbidity and body mass index (BMI) of 37.0 to 37.9 in adult (HCC)  Pt is a candidate for medication assisted weight loss therapy based on BMI and Comorbidities.  (BMI >30, Knee OA, PreDM, HLD ). She does have medications that work against her weight loss.   Phentermine is a concern given her underlying Bipolar.    Louann has been on weight loss regimen of low-calorie diet, increased physical activity, and behavior modifications for >6 mos and it will be continued while patient is on the medication.    - Bloodwork- up-to-date   - Discussed Compound GLP given lack of insurance coverage.    - Semaglutide-B12 0.25MG   - Semaglutide (Compounded): At the patient’s request, I am providing a prescription for compounded semaglutide to be used as part of the weight management plan. The patient is aware of the potential side effects, including but not limited to nausea, vomiting, diarrhea, and rare risks such as pancreatitis or gallbladder issues. The patient understands that the prescribing physician is not responsible for any side effects or issues arising from the compounded medication, as its preparation and quality are the sole responsibility of the compounding pharmacy. Pt understands that this is not the same as the FDA approved and monitored medication (Wegovy).     - Discussed MOA of the RX and possible AE-not limited to N/V/abdominal pain/pancreatitis and pt verbalized understanding  - Denies any PMH/FMH of pancreatitis, pancreatic cancer, thyroid cancer, MEN2   - Comprehensive Lifestyle Intervention to include: Diet, Exercise, Behavioral Modification  - Diet: <1800kcal/day, Goal of <100g carbs;  >4-5 Fruit/veges, lean meats/dairy, >64oz of water  - Exercise: Recommend >150m/wk moderate/vigrous activity  (discussed strength training)  - Ensure 8hr of sleep/night and work on stress reduction.    - Contin Glu Rev goals -- carbs with protein, no late night snacking  - F/U in 2mos   Orders:    CUSTOM MEDICATION; Semaglutide/cyanocobalamin injection   0.25mg  Concentration: 1 / 0.5 mg/mL   Amount: 2.5 ML vial and supplies  Instructions: Inject 0.25ml subcutaneously   weekly  Dispense: 1 each; Refill: 2    Vaginal irritation  Persistent despite previous treatment with a cream. Painful intercourse and bleeding skin noted.  -Refer to gynecology for further evaluation and management.  Orders:    [facility] - INTERNAL    Bipolar affective disorder, remission status unspecified (HCC)  Chronic, actively managed by Psych.  No SI/HI, but Depression Exacerbated by recent stressors including 's job loss.  Prediabetes  pt that HbA1C was elevated in the range of Pre-DM (5.6%-6.4%).  Discussed increased risk of progression with DM.    - Discussed TX options to include focus on lifestyle modifications vs RX therapy. Focus on weight loss    Mixed hyperlipidemia  Cholesterol shows improvement from last year  CHRONIC-Stable--Known HLD based on Lipid panel (LDL>130mg/dl); ASCVD Risk *3.7 %  - Continue Simvastatin.   - Lipid Panel annually   - Discussed importance of healthy diet and routine exercise (30m moderate intensity 5+/wk.)     Assessment & Plan       SUBJECTIVE   CC: Weight Management  History of Present Illness:  History obtained from patient.      History of Present Illness  Louann Castillo is a 59 year old female who presents with concerns about weight management and mental health challenges.    She experiences depression, which has worsened due to recent stressors, including her 's job loss and the uncertainty surrounding her 's employment. This time of year is particularly challenging for her mental health.  Which is managed by Psychiatry. No SI.HI,     VAGINAL IRRITATION: She mentions a history of vaginal  discomfort, including a rash and burning sensation, which she associates with menopause. She uses a cream that provides some relief but notes persistent symptoms. She has not been sexually active until later in life and reports a lack of sex drive, which she attributes to her antidepressant use. Would like to see GYN to address this.         WEIGHT LOSS:  Well-rounded- - fruit/vegetables-- does take MVN; Avoids eating out/fried foods;  Making a point to not snack. Protein bar AM, protein shake-lunch/ crackers-cream cheese/raisons; Dinner- beef stroganoff, chicken jaimes; Does eat spinach  B-Protein Bar   L- may or may not eat lunch   D- CPK pizza, Chicekn Parm, Mac and Cheese, TomatO  Diests since she was 21 yo.   Weight watchers;    Wants to loose weight but feels her efforts are futile.    Would be open to medications.    Exercise- tryignt o take th dog for walks. Wii Danheather/ Abba   No Prior Meds-    02/25/25- Since last visit has been doing Apple Cidar Vinegar.    Concerned about A1c levels--- Which is now in PreDM range .   She has not used medications specifically for weight loss before but is open to exploring options to alleviate pressure on her knees and ankles, which have limited her mobility.        PreDM-HBA1C 5.8%  HLD- 4.8% restarted her statin   Bipolar II/FRITZ/MDD-Dr. Echeverria- Psych --Oxcarbazepine, Venlafaxine, Trazodone.   Exercise/Stress Induced & Allergic Rhinitis- can't breathe through her nose;  hasn't had recurrent Sinusitis since being on Singulair.    Pshx: Cholecystectomy, CSPY, Hymenotomy; Hip SX  All: Wellbutrin; Lamictal; PCN   Meds: as below  Fam hx:  RCC-F; Breast Ca-Max2; CRC-MGF, CVA-MGM; Heart-M, MGM;  Psych-m, mgf  Soc hx: no tob/occ etoh/no illicits; ; Retired;   Ob/gyne: Post-melissa . last pap: -09/11/2023, last mamm- 01/28/2025       Problem List:  Patient Active Problem List   Diagnosis    Abnormal mammogram    Akathisia    Allergic rhinitis    Bipolar disorder (HCC)     Hypertriglyceridemia    Mixed hyperlipidemia    Obesity with body mass index 30 or greater    Class 2 severe obesity due to excess calories with serious comorbidity and body mass index (BMI) of 37.0 to 37.9 in adult (HCC)    Bilateral primary osteoarthritis of knee       Historical Information   Past Medical History  Past Medical History:    Allergic rhinitis    All year around, indoor & outdoor    Anxiety    Arthritis    Asthma (HCC)    Life long, not diagnosed until 1990s    Bipolar 2 disorder (HCC)    Calculus of kidney    3 incidents, last one in mid 1990s    Depression    Diagnosed 1994    Esophageal reflux    Hyperlipidemia    Kidney stones    Seasonal allergies     Past Surgical History  Past Surgical History:   Procedure Laterality Date    Cholecystectomy  2012    Full surgery with complications. 9 days in patients    Colonoscopy  2018    Hip surgery      left hip labral tear    Hymenotomy simple incision      Other surgical history  2010s    Hip tear     Family History  Family History   Problem Relation Age of Onset    Depression Mother         Denial    Heart Disorder Mother         Pacemaker in late 80s    Lipids Mother         High triglycerides    Musculo-skelatal Disorder Mother         Osteoarthritis    Psychiatric Mother     Cancer Father         Kidney    Heart Disorder Maternal Grandmother         Angina    Stroke Maternal Grandmother     Cancer Maternal Grandfather         Naif    Depression Maternal Grandfather         Hospitalzed at least twice, received shock treatment in the 1930s    Psychiatric Maternal Grandfather     Cancer Paternal Grandmother     Breast Cancer Maternal Aunt 75        mid to late 70's    Breast Cancer Maternal Aunt 60     Social History  Social History     Socioeconomic History    Marital status:    Tobacco Use    Smoking status: Never    Smokeless tobacco: Never    Tobacco comments:     Life time 3 cigarettes   Vaping Use    Vaping status: Never Used   Substance  and Sexual Activity    Alcohol use: Yes     Alcohol/week: 4.0 standard drinks of alcohol     Types: 1 Glasses of wine, 2 Cans of beer, 1 Shots of liquor per week     Comment: occasionally    Drug use: Not Currently     Types: Cannabis     Comment: Last use 1979   Other Topics Concern    Caffeine Concern No    Exercise No    Seat Belt No    Weight Concern Yes    Special Diet No    Stress Concern No     Social Drivers of Health     Food Insecurity: No Food Insecurity (2/25/2025)    NCSS - Food Insecurity     Worried About Running Out of Food in the Last Year: No     Ran Out of Food in the Last Year: No   Transportation Needs: No Transportation Needs (2/25/2025)    NCSS - Transportation     Lack of Transportation: No   Housing Stability: Not At Risk (2/25/2025)    NCSS - Housing/Utilities     Has Housing: Yes     Worried About Losing Housing: No     Unable to Get Utilities: No      Social History     Social History Narrative    Not on file      Medications   Current Outpatient Medications   Medication Sig Dispense Refill    CUSTOM MEDICATION Semaglutide/cyanocobalamin injection   0.25mg  Concentration: 1 / 0.5 mg/mL   Amount: 2.5 ML vial and supplies  Instructions: Inject 0.25ml subcutaneously   weekly 1 each 2    montelukast 10 MG Oral Tab Take 1 tablet (10 mg total) by mouth daily. 90 tablet 3    pantoprazole 20 MG Oral Tab EC Take 1 tablet (20 mg total) by mouth before breakfast. 90 tablet 3    simvastatin 20 MG Oral Tab Take 1 tablet (20 mg total) by mouth nightly. 90 tablet 3    clobetasol 0.05 % External Ointment Apply to the affected area on the Vulva nightly for 4 wks 45 g 0    traZODone 150 MG Oral Tab Take 1 tablet (150 mg total) by mouth nightly as needed for Sleep.      OXcarbazepine 300 MG Oral Tab Take 1 tablet (300 mg total) by mouth daily.      venlafaxine ER 75 MG Oral Capsule SR 24 Hr Take 1 capsule (75 mg total) by mouth daily.      Elastic Bandages & Supports (ACE ANKLE BRACE) Does not apply Misc  Please provide patient with a CAM/walking boot. 1 each 0    cetirizine 1 MG/ML Oral Solution Take 5 mL (5 mg total) by mouth daily.      Venlafaxine HCl  MG Oral Capsule SR 24 Hr Take 1 capsule (150 mg total) by mouth 2 (two) times daily.        Allergies   Allergies   Allergen Reactions    Other UNKNOWN    Wellbutrin [Bupropion] OTHER (SEE COMMENTS)     Skin breaks out when touched    Lamictal [Lamotrigine] RASH    Penicillins RASH          OBJECTIVE   VITALS: /80   Pulse 94   Temp 97.2 °F (36.2 °C) (Temporal)   Resp 16   Ht 5' 3.5\" (1.613 m)   Wt 211 lb (95.7 kg)   SpO2 97%   BMI 36.79 kg/m²  No LMP recorded. Patient is postmenopausal.    Wt Readings from Last 6 Encounters:   02/25/25 211 lb (95.7 kg)   12/12/24 207 lb (93.9 kg)   06/11/24 207 lb (93.9 kg)   05/25/24 207 lb (93.9 kg)   10/10/23 199 lb (90.3 kg)   09/11/23 201 lb (91.2 kg)     BP Readings from Last 3 Encounters:   02/25/25 120/80   12/12/24 114/82   05/25/24 (!) 169/90       PHYSICAL EXAM  GEN: pleasant, well-appearing in NAD  SKIN: no visible rashes, lesions, or evidence of trauma  HEENT:  no conjunctivitis or scleral icterus; mmm  PULM: breathing comfortably on room air  MSK: moving all extremities well, no weakness or joint tenderness  NEURO: CNs grossly intact, no focal weakness, alert and oriented       Lab Results   Component Value Date     12/12/2024    A1C 5.8 (H) 12/12/2024     Lab Results   Component Value Date/Time    CHOLEST 249 (H) 12/12/2024 11:13 AM    TRIG 182 (H) 12/12/2024 11:13 AM    HDL 57 12/12/2024 11:13 AM     (H) 12/12/2024 11:13 AM    NONHDLC 192 (H) 12/12/2024 11:13 AM       Lab Results   Component Value Date    WBC 6.5 12/12/2024    RBC 4.78 12/12/2024    HGB 14.6 12/12/2024    HCT 45.2 12/12/2024    MCV 94.6 12/12/2024    MCH 30.5 12/12/2024    MCHC 32.3 12/12/2024    RDW 14.2 12/12/2024    .0 12/12/2024      Lab Results   Component Value Date    GLU 92 12/12/2024    BUN 13  12/12/2024    CREATSERUM 0.74 12/12/2024    ANIONGAP 9 12/12/2024    CA 9.9 12/12/2024    OSMOCALC 288 12/12/2024    ALKPHO 89 12/12/2024    AST 27 12/12/2024    ALT 36 12/12/2024    BILT 0.3 12/12/2024    TP 8.0 12/12/2024    ALB 4.3 12/12/2024    GLOBULIN 3.7 (H) 12/12/2024     12/12/2024    K 4.1 12/12/2024     12/12/2024    CO2 27.0 12/12/2024          .Approximately 38 minutes was spent: preparing to see the patient (reviewing prior tests, office notes, and consultant notes), personally obtaining a history, conducting a physical exam, counseling the patient on the plan of care, entering appropriate orders, and documenting clinical information in the electronic health record.       Nirali Jeffers DO    02/25/25 11:31 AM

## 2025-02-25 NOTE — PROGRESS NOTES
The following individual(s) verbally consented to be recorded using ambient AI listening technology and understand that they can each withdraw their consent to this listening technology at any point by asking the clinician to turn off or pause the recording:    Patient name: Louann Castillo   Additional names:

## 2025-02-25 NOTE — ASSESSMENT & PLAN NOTE
Pt is a candidate for medication assisted weight loss therapy based on BMI and Comorbidities.  (BMI >30, Knee OA, PreDM, HLD ). She does have medications that work against her weight loss.   Phentermine is a concern given her underlying Bipolar.    Louann has been on weight loss regimen of low-calorie diet, increased physical activity, and behavior modifications for >6 mos and it will be continued while patient is on the medication.    - Bloodwork- up-to-date   - Discussed Compound GLP given lack of insurance coverage.    - Semaglutide-B12 0.25MG   - Semaglutide (Compounded): At the patient’s request, I am providing a prescription for compounded semaglutide to be used as part of the weight management plan. The patient is aware of the potential side effects, including but not limited to nausea, vomiting, diarrhea, and rare risks such as pancreatitis or gallbladder issues. The patient understands that the prescribing physician is not responsible for any side effects or issues arising from the compounded medication, as its preparation and quality are the sole responsibility of the compounding pharmacy. Pt understands that this is not the same as the FDA approved and monitored medication (Wegovy).     - Discussed MOA of the RX and possible AE-not limited to N/V/abdominal pain/pancreatitis and pt verbalized understanding  - Denies any PMH/FMH of pancreatitis, pancreatic cancer, thyroid cancer, MEN2   - Comprehensive Lifestyle Intervention to include: Diet, Exercise, Behavioral Modification  - Diet: <1800kcal/day, Goal of <100g carbs;  >4-5 Fruit/veges, lean meats/dairy, >64oz of water  - Exercise: Recommend >150m/wk moderate/vigrous activity (discussed strength training)  - Ensure 8hr of sleep/night and work on stress reduction.    - Contin Glu Rev goals -- carbs with protein, no late night snacking  - F/U in 2mos   Orders:    CUSTOM MEDICATION; Semaglutide/cyanocobalamin injection   0.25mg  Concentration: 1 / 0.5 mg/mL    Amount: 2.5 ML vial and supplies  Instructions: Inject 0.25ml subcutaneously   weekly  Dispense: 1 each; Refill: 2

## 2025-02-26 PROBLEM — N89.8 VAGINAL IRRITATION: Status: ACTIVE | Noted: 2025-02-26

## 2025-02-26 PROBLEM — R73.03 PREDIABETES: Status: ACTIVE | Noted: 2025-02-26

## 2025-02-26 NOTE — ASSESSMENT & PLAN NOTE
Cholesterol shows improvement from last year  CHRONIC-Stable--Known HLD based on Lipid panel (LDL>130mg/dl); ASCVD Risk *3.7 %  - Continue Simvastatin.   - Lipid Panel annually   - Discussed importance of healthy diet and routine exercise (30m moderate intensity 5+/wk.)

## 2025-02-26 NOTE — ASSESSMENT & PLAN NOTE
Persistent despite previous treatment with a cream. Painful intercourse and bleeding skin noted.  -Refer to gynecology for further evaluation and management.  Orders:    [facility] - INTERNAL

## 2025-02-26 NOTE — ASSESSMENT & PLAN NOTE
Chronic, actively managed by Psych.  No SI/HI, but Depression Exacerbated by recent stressors including 's job loss.

## 2025-02-26 NOTE — ASSESSMENT & PLAN NOTE
pt that HbA1C was elevated in the range of Pre-DM (5.6%-6.4%).  Discussed increased risk of progression with DM.    - Discussed TX options to include focus on lifestyle modifications vs RX therapy. Focus on weight loss

## 2025-03-27 ENCOUNTER — OFFICE VISIT (OUTPATIENT)
Dept: OBGYN CLINIC | Facility: CLINIC | Age: 60
End: 2025-03-27
Payer: COMMERCIAL

## 2025-03-27 VITALS
DIASTOLIC BLOOD PRESSURE: 74 MMHG | HEART RATE: 87 BPM | SYSTOLIC BLOOD PRESSURE: 120 MMHG | HEIGHT: 63 IN | BODY MASS INDEX: 37 KG/M2

## 2025-03-27 DIAGNOSIS — N76.3 CHRONIC VULVITIS: ICD-10-CM

## 2025-03-27 DIAGNOSIS — N90.89 VULVAR BLEEDING: ICD-10-CM

## 2025-03-27 DIAGNOSIS — N94.89 VULVAR BURNING: ICD-10-CM

## 2025-03-27 DIAGNOSIS — N76.0 VAGINITIS AND VULVOVAGINITIS: ICD-10-CM

## 2025-03-27 DIAGNOSIS — S30.814A: Primary | ICD-10-CM

## 2025-03-27 DIAGNOSIS — N76.2 ACUTE VULVITIS: ICD-10-CM

## 2025-03-27 DIAGNOSIS — B37.31 VULVAR CANDIDIASIS: ICD-10-CM

## 2025-03-27 PROCEDURE — 81514 NFCT DS BV&VAGINITIS DNA ALG: CPT | Performed by: STUDENT IN AN ORGANIZED HEALTH CARE EDUCATION/TRAINING PROGRAM

## 2025-03-27 PROCEDURE — 56605 BIOPSY OF VULVA/PERINEUM: CPT | Performed by: STUDENT IN AN ORGANIZED HEALTH CARE EDUCATION/TRAINING PROGRAM

## 2025-03-27 PROCEDURE — 3074F SYST BP LT 130 MM HG: CPT | Performed by: STUDENT IN AN ORGANIZED HEALTH CARE EDUCATION/TRAINING PROGRAM

## 2025-03-27 PROCEDURE — 3008F BODY MASS INDEX DOCD: CPT | Performed by: STUDENT IN AN ORGANIZED HEALTH CARE EDUCATION/TRAINING PROGRAM

## 2025-03-27 PROCEDURE — 3078F DIAST BP <80 MM HG: CPT | Performed by: STUDENT IN AN ORGANIZED HEALTH CARE EDUCATION/TRAINING PROGRAM

## 2025-03-27 PROCEDURE — 88305 TISSUE EXAM BY PATHOLOGIST: CPT | Performed by: STUDENT IN AN ORGANIZED HEALTH CARE EDUCATION/TRAINING PROGRAM

## 2025-03-27 PROCEDURE — 99204 OFFICE O/P NEW MOD 45 MIN: CPT | Performed by: STUDENT IN AN ORGANIZED HEALTH CARE EDUCATION/TRAINING PROGRAM

## 2025-03-27 RX ORDER — FLUCONAZOLE 150 MG/1
150 TABLET ORAL
Qty: 3 TABLET | Refills: 0 | Status: SHIPPED | OUTPATIENT
Start: 2025-03-27 | End: 2025-04-03

## 2025-03-27 RX ORDER — LIDOCAINE HYDROCHLORIDE AND EPINEPHRINE BITARTRATE 20; .01 MG/ML; MG/ML
1.7 INJECTION, SOLUTION SUBCUTANEOUS ONCE
Status: SHIPPED | OUTPATIENT
Start: 2025-03-27

## 2025-03-27 RX ORDER — NYSTATIN 100000 U/G
1 OINTMENT TOPICAL 2 TIMES DAILY
Qty: 30 G | Refills: 1 | Status: SHIPPED | OUTPATIENT
Start: 2025-03-27 | End: 2025-04-24

## 2025-03-27 RX ORDER — CLOBETASOL PROPIONATE 0.5 MG/G
OINTMENT TOPICAL
Qty: 120 G | Refills: 3 | Status: SHIPPED | OUTPATIENT
Start: 2025-03-27

## 2025-03-27 NOTE — PROGRESS NOTES
[facility]  Obstetrics and Gynecology   History & Physical      Chief complaint:   Chief Complaint   Patient presents with    Gyn Problem     Vaginal rashes on and off since , burning, itching, painful intercourse, some redness        Subjective:     HPI: Louann Castillo is a 59 year old  presenting for problem visit vulvar rashes..    Her PCP is Nirali Jeffers DO.  Menopause at 50 years old.    Since 2024 has had intermittent \"vaginal rashes\"  She's feels it on inside and cannot see it easily without a mirror.  It can go on the outside and is particularly worst on the left side.  A little bit of bleeding.  It is going all the way to her groin area.  It is red in color.  Hasn't noticed any pus.  No issues with fevers or chills.  No issues with yeast or BV infections.    Sexually active - sex is painful on entry.  KY is not helpful.  Has no libido.  Never has used HRT.    Is on miralax for constipation  Started semglutide for weight loss 3 weeks     Gynecologic Hygiene:  - Vulvar: Water, soap   - Douche?: no  - Cotton underwear/liner that fully covers vulva: yes - liner is cotton     Cancer Screening:  Family history of ovarian/endometrial/cervical/breast cancer? : no  - paternal:  - maternal:   Cervical cancer:   - last pap/HPV    - due for one today? : no  - History of abnl pap/HPV: never  Breast cancer:   - any breast issues today?: no   - last mammogram BIRADS 1   - due for mammogram today? :no   Colon cancer:   - family history grandfather maternal   - last colonoscopy: 6 years ago     ROS negative unless otherwise stated above    OB History  OB History    Para Term  AB Living   0 0 0 0 0 0   SAB IAB Ectopic Multiple Live Births   0 0 0 0 0     OB History    Para Term  AB Living   0 0 0 0 0 0   SAB IAB Ectopic Multiple Live Births   0 0 0 0 0       PMH:  Past Medical History:    Allergic rhinitis    All year around, indoor & outdoor    Amenorrhea     Menopause    Anxiety    Arthritis    Asthma (HCC)    Life long, not diagnosed until 1990s    Bipolar 2 disorder (HCC)    Calculus of kidney    3 incidents, last one in mid 1990s    Depression    Diagnosed 1994    Dyspareunia    Esophageal reflux    Hyperlipidemia    Kidney stones    Seasonal allergies       PSH:  Past Surgical History:   Procedure Laterality Date    Cholecystectomy  2012    Full surgery with complications. 9 days in patients    Colonoscopy  2018    Hip surgery      left hip labral tear    Hymenotomy simple incision      Other surgical history  2010s    Hip tear       Meds:  Medications Ordered Prior to Encounter[1]    All:  Allergies[2]    Social History:  Social History     Socioeconomic History    Marital status:    Tobacco Use    Smoking status: Passive Smoke Exposure - Never Smoker    Smokeless tobacco: Never    Tobacco comments:     Life time 3 cigarettes   Vaping Use    Vaping status: Never Used   Substance and Sexual Activity    Alcohol use: Yes     Alcohol/week: 4.0 standard drinks of alcohol     Types: 1 Glasses of wine, 2 Cans of beer, 1 Shots of liquor per week     Comment: occasionally    Drug use: Not Currently     Types: Cannabis     Comment: Last use 1979    Sexual activity: Yes     Partners: Male   Other Topics Concern    Caffeine Concern No    Exercise No    Seat Belt No    Weight Concern Yes     Comment: Started shot 3/2025    Special Diet No    Stress Concern No     Social Drivers of Health     Food Insecurity: No Food Insecurity (2/25/2025)    NCSS - Food Insecurity     Worried About Running Out of Food in the Last Year: No     Ran Out of Food in the Last Year: No   Transportation Needs: No Transportation Needs (2/25/2025)    NCSS - Transportation     Lack of Transportation: No   Housing Stability: Not At Risk (2/25/2025)    NCSS - Housing/Utilities     Has Housing: Yes     Worried About Losing Housing: No     Unable to Get Utilities: No        Family History:  Family  History   Problem Relation Age of Onset    Depression Mother         Denial    Heart Disorder Mother         Pacemaker in late 80s    Lipids Mother         High triglycerides    Musculo-skelatal Disorder Mother         Osteoarthritis    Psychiatric Mother     Cancer Father         Kidney    Heart Disorder Maternal Grandmother         Angina    Stroke Maternal Grandmother     Cancer Maternal Grandfather         Naif    Depression Maternal Grandfather         Hospitalzed at least twice, received shock treatment in the 1930s    Psychiatric Maternal Grandfather     Cancer Paternal Grandmother     Breast Cancer Maternal Aunt 75        mid to late 70's    Breast Cancer Maternal Aunt 60       Immunization History:  Immunization History   Administered Date(s) Administered    Covid-19 Vaccine Moderna 100 mcg/0.5 ml 04/17/2021, 05/15/2021, 12/06/2021    Covid-19 Vaccine Moderna Bivalent 50mcg/0.5mL 12+ years 09/29/2022    FLUZONE 6 months and older PFS 0.5 ml (02895) 09/25/2018, 09/17/2019    Flucelvax 0.5 Ml Quad PFS Single Dose 11/02/2020    Influenza Vaccine, trivalent (IIV3), PF 0.5mL (41649) 12/12/2024    Moderna Covid-19 Vaccine 50mcg/0.5ml 12yrs+ 10/17/2023, 11/14/2024    TDAP 09/11/2023    Zoster Vaccine Recombinant Adjuvanted (Shingrix) 09/11/2023         Objective:     Vitals:    03/27/25 0938   BP: 120/74   Pulse: 87   Height: 63\"       Body mass index is 37.38 kg/m².    Physical Exam:     General: normal appearance  HEENT: normocephalic  Breast: normal contour  Respiratory: normal work of breathing, no extra use of accessory muscles  Cardiac: normal rate  Abdominal: Nontender   MSK: normal range of motion  Neuro: normal movement, normal sensory  Skin: no abnormalities seen    Pelvic Exam:    - angry, erythematous rash covering the entire vulvar and perianal region as well as the left inguinal area.  Speculum  - normal appearing urethral meatus, urethra  - vaginal mucosa is pale and atrophic consistent w/ post  menopausal low-estrogen state  - cervix without masses     Procedures: vulvar biopsy    Betadine x3  Lidocaine block  Punch biopsy x1 in left labia majora  2 single interrupted stitches with 3-0 vicryl with good hemostasis      Labs:  Lab Results   Component Value Date    WBC 6.5 12/12/2024    RBC 4.78 12/12/2024    HGB 14.6 12/12/2024    HCT 45.2 12/12/2024    MCV 94.6 12/12/2024    MCH 30.5 12/12/2024    MCHC 32.3 12/12/2024    RDW 14.2 12/12/2024    .0 12/12/2024        Lab Results   Component Value Date    GLU 92 12/12/2024    BUN 13 12/12/2024    CREATSERUM 0.74 12/12/2024    ANIONGAP 9 12/12/2024    CA 9.9 12/12/2024    OSMOCALC 288 12/12/2024    ALKPHO 89 12/12/2024    AST 27 12/12/2024    ALT 36 12/12/2024    BILT 0.3 12/12/2024    TP 8.0 12/12/2024    ALB 4.3 12/12/2024    GLOBULIN 3.7 (H) 12/12/2024     12/12/2024    K 4.1 12/12/2024     12/12/2024    CO2 27.0 12/12/2024       Lab Results   Component Value Date    CHOLEST 249 (H) 12/12/2024    TRIG 182 (H) 12/12/2024    HDL 57 12/12/2024     (H) 12/12/2024    VLDL 36 (H) 12/12/2024    NONHDLC 192 (H) 12/12/2024        Lab Results   Component Value Date    TSH 1.652 12/12/2024        Lab Results   Component Value Date     12/12/2024    A1C 5.8 (H) 12/12/2024       Imaging:  [facility] HARRY 2D+3D SCREENING BILAT (CPT=77067/75183)    Result Date: 1/28/2025  CONCLUSION:  Stable mammogram  BI-RADS CATEGORY:  DIAGNOSTIC CATEGORY 1 - NEGATIVE ASSESSMENT.   RECOMMENDATIONS:  ROUTINE MAMMOGRAM AND CLINICAL EVALUATION IN 12 MONTHS.       A letter explaining the results in lay terms has been sent to the patient.  This exam was evaluated with a computer-aided device.  This patient's information has been entered into a reminder system with a target due date for the next mammogram.   LOCATION:  [facility]   Dictated by (CST): Shasta Dugan MD on 1/28/2025 at 10:31 AM     Finalized by (CST): Shasta Dugan MD on 1/28/2025 at 10:32 AM   [facility]  [facility] [address] [place] [address] [phone]      Assessment:     Louann Castillo is a 59 year old  presenting for suspected vulvar candidiasis and suspected vulvar dermatoses and GSM    #vulvar candidiasis  - diflucan PO  - nystatin ointment BID  - follow up vaginitis panel  - warm water only, no soap, no underwear     #dermatoses  - clobetasol BID x1 month, daily x1 month  - follow up punch biopsy    #Genitourinary syndrome of menopause  - Supplemental water-based lubricants for sexual intercourse (astroglide, slippery stuff, k-y jelly)  - consider vaginal estrogen at next visit      Follow up in 1 month      Cindy Quispe MD   [facility] - OBGYN    Note to patient and family:  The  Century Cures Act makes medical notes available to patients in the interest of transparency.  However, please be advised that this is a medical document.  It is intended as a peer to peer communication.  It is written in medical language and may contain abbreviations or verbiage that are technical and unfamiliar.  It may appear blunt or direct.  Medical documents are intended to carry relevant information, facts as evident, and the clinical opinion of the practitioner.          [1]   Current Outpatient Medications on File Prior to Visit   Medication Sig Dispense Refill    CUSTOM MEDICATION Semaglutide/cyanocobalamin injection   0.25mg  Concentration: 1 / 0.5 mg/mL   Amount: 2.5 ML vial and supplies  Instructions: Inject 0.25ml subcutaneously   weekly 1 each 2    montelukast 10 MG Oral Tab Take 1 tablet (10 mg total) by mouth daily. 90 tablet 3    pantoprazole 20 MG Oral Tab EC Take 1 tablet (20 mg total) by mouth before breakfast. 90 tablet 3    simvastatin 20 MG Oral Tab Take 1 tablet (20 mg total) by mouth nightly. 90 tablet 3    clobetasol 0.05 % External Ointment Apply to the affected area on the Vulva nightly for 4 wks 45 g 0    traZODone 150 MG Oral Tab Take 1 tablet (150 mg  total) by mouth nightly as needed for Sleep.      OXcarbazepine 300 MG Oral Tab Take 1 tablet (300 mg total) by mouth daily.      venlafaxine ER 75 MG Oral Capsule SR 24 Hr Take 1 capsule (75 mg total) by mouth daily.      Elastic Bandages & Supports (ACE ANKLE BRACE) Does not apply Misc Please provide patient with a CAM/walking boot. 1 each 0    cetirizine 1 MG/ML Oral Solution Take 5 mL (5 mg total) by mouth daily.      Venlafaxine HCl  MG Oral Capsule SR 24 Hr Take 1 capsule (150 mg total) by mouth 2 (two) times daily.       No current facility-administered medications on file prior to visit.   [2]   Allergies  Allergen Reactions    Other UNKNOWN    Wellbutrin [Bupropion] OTHER (SEE COMMENTS)     Skin breaks out when touched    Lamictal [Lamotrigine] RASH    Penicillins RASH

## 2025-03-28 LAB
BV BACTERIA DNA VAG QL NAA+PROBE: POSITIVE
C GLABRATA DNA VAG QL NAA+PROBE: NEGATIVE
C KRUSEI DNA VAG QL NAA+PROBE: NEGATIVE
CANDIDA DNA VAG QL NAA+PROBE: POSITIVE
T VAGINALIS DNA VAG QL NAA+PROBE: NEGATIVE

## 2025-03-28 RX ORDER — METRONIDAZOLE 500 MG/1
500 TABLET ORAL 2 TIMES DAILY
Qty: 14 TABLET | Refills: 0 | Status: SHIPPED | OUTPATIENT
Start: 2025-03-28 | End: 2025-04-04

## 2025-03-28 NOTE — PROGRESS NOTES
Patient aware of vaginosis/vaginitis results and recommendations. She has both a yeast and BV infection.  Already sent diflucan for her but she should also take the flagyl. Patient verbalized understanding.

## 2025-04-29 ENCOUNTER — OFFICE VISIT (OUTPATIENT)
Dept: OBGYN CLINIC | Facility: CLINIC | Age: 60
End: 2025-04-29
Payer: COMMERCIAL

## 2025-04-29 VITALS
SYSTOLIC BLOOD PRESSURE: 120 MMHG | HEIGHT: 63 IN | BODY MASS INDEX: 37 KG/M2 | HEART RATE: 92 BPM | DIASTOLIC BLOOD PRESSURE: 81 MMHG

## 2025-04-29 DIAGNOSIS — N95.8 GENITOURINARY SYNDROME OF MENOPAUSE: ICD-10-CM

## 2025-04-29 DIAGNOSIS — L28.0 LICHEN SIMPLEX CHRONICUS: Primary | ICD-10-CM

## 2025-04-29 DIAGNOSIS — B37.31 VULVAR CANDIDIASIS: ICD-10-CM

## 2025-04-29 PROCEDURE — 3079F DIAST BP 80-89 MM HG: CPT | Performed by: STUDENT IN AN ORGANIZED HEALTH CARE EDUCATION/TRAINING PROGRAM

## 2025-04-29 PROCEDURE — 3074F SYST BP LT 130 MM HG: CPT | Performed by: STUDENT IN AN ORGANIZED HEALTH CARE EDUCATION/TRAINING PROGRAM

## 2025-04-29 PROCEDURE — 99214 OFFICE O/P EST MOD 30 MIN: CPT | Performed by: STUDENT IN AN ORGANIZED HEALTH CARE EDUCATION/TRAINING PROGRAM

## 2025-04-29 PROCEDURE — 3008F BODY MASS INDEX DOCD: CPT | Performed by: STUDENT IN AN ORGANIZED HEALTH CARE EDUCATION/TRAINING PROGRAM

## 2025-04-29 PROCEDURE — 99459 PELVIC EXAMINATION: CPT | Performed by: STUDENT IN AN ORGANIZED HEALTH CARE EDUCATION/TRAINING PROGRAM

## 2025-04-29 RX ORDER — ESTRADIOL 0.1 MG/G
CREAM VAGINAL
Qty: 42.5 G | Refills: 5 | Status: SHIPPED | OUTPATIENT
Start: 2025-04-29

## 2025-04-29 NOTE — PROGRESS NOTES
[facility]  Obstetrics and Gynecology   History & Physical      Chief complaint:   Chief Complaint   Patient presents with    Follow - Up     -Last seen on 3/27/25 for Vulvar Biopsy         Subjective:     HPI: Louann Castillo is a 59 year old  presenting for problem visit vulvar rashes..    Her PCP is Nirali Jeffers DO.  Menopause at 50 years old.      2025: punch biopsy chronic dermatitis - lichen simplex chronicus from BV and candida.  Rx diflucan and flagyl treatment.  Started on clobetasol and nystatin.  2025  No longer itchy or painful.  Has been using the nystatin and clobetasol as directed      ROS negative unless otherwise stated above    OB History  OB History    Para Term  AB Living   0 0 0 0 0 0   SAB IAB Ectopic Multiple Live Births   0 0 0 0 0     OB History    Para Term  AB Living   0 0 0 0 0 0   SAB IAB Ectopic Multiple Live Births   0 0 0 0 0       PMH:  Past Medical History:    Allergic rhinitis    All year around, indoor & outdoor    Amenorrhea    Menopause    Anxiety    Arthritis    Asthma (HCC)    Life long, not diagnosed until     Bipolar 2 disorder (HCC)    Calculus of kidney    3 incidents, last one in mid     Depression    Diagnosed     Dyspareunia    Esophageal reflux    Hyperlipidemia    Kidney stones    Seasonal allergies       PSH:  Past Surgical History:   Procedure Laterality Date    Cholecystectomy  2012    Full surgery with complications. 9 days in patients    Colonoscopy  2018    Hip surgery      left hip labral tear    Hymenotomy simple incision      Other surgical history  2010s    Hip tear       Meds:  Medications Ordered Prior to Encounter[1]    All:  Allergies[2]    Social History:  Social History     Socioeconomic History    Marital status:    Tobacco Use    Smoking status: Passive Smoke Exposure - Never Smoker    Smokeless tobacco: Never    Tobacco comments:     Life time 3 cigarettes   Vaping Use     Vaping status: Never Used   Substance and Sexual Activity    Alcohol use: Yes     Alcohol/week: 4.0 standard drinks of alcohol     Types: 1 Glasses of wine, 2 Cans of beer, 1 Shots of liquor per week     Comment: occasionally    Drug use: Not Currently     Types: Cannabis     Comment: Last use 1979    Sexual activity: Yes     Partners: Male   Other Topics Concern    Caffeine Concern No    Exercise No    Seat Belt No    Weight Concern Yes     Comment: Started shot 3/2025    Special Diet No    Stress Concern No     Social Drivers of Health     Food Insecurity: No Food Insecurity (2/25/2025)    NCSS - Food Insecurity     Worried About Running Out of Food in the Last Year: No     Ran Out of Food in the Last Year: No   Transportation Needs: No Transportation Needs (2/25/2025)    NCSS - Transportation     Lack of Transportation: No   Housing Stability: Not At Risk (2/25/2025)    NCSS - Housing/Utilities     Has Housing: Yes     Worried About Losing Housing: No     Unable to Get Utilities: No        Family History:  Family History   Problem Relation Age of Onset    Depression Mother         Denial    Heart Disorder Mother         Pacemaker in late 80s    Lipids Mother         High triglycerides    Musculo-skelatal Disorder Mother         Osteoarthritis    Psychiatric Mother     Cancer Father         Kidney    Heart Disorder Maternal Grandmother         Angina    Stroke Maternal Grandmother     Cancer Maternal Grandfather         Naif    Depression Maternal Grandfather         Hospitalzed at least twice, received shock treatment in the 1930s    Psychiatric Maternal Grandfather     Cancer Paternal Grandmother     Breast Cancer Maternal Aunt 75        mid to late 70's    Breast Cancer Maternal Aunt 60       Immunization History:  Immunization History   Administered Date(s) Administered    Covid-19 Vaccine Moderna 100 mcg/0.5 ml 04/17/2021, 05/15/2021, 12/06/2021    Covid-19 Vaccine Moderna Bivalent 50mcg/0.5mL 12+ years  09/29/2022    FLUZONE 6 months and older PFS 0.5 ml (86609) 09/25/2018, 09/17/2019    Flucelvax 0.5 Ml Quad PFS Single Dose 11/02/2020    Influenza Vaccine, trivalent (IIV3), PF 0.5mL (93572) 12/12/2024    Moderna Covid-19 Vaccine 50mcg/0.5ml 12yrs+ 10/17/2023, 11/14/2024    TDAP 09/11/2023    Zoster Vaccine Recombinant Adjuvanted (Shingrix) 09/11/2023         Objective:     Vitals:    04/29/25 0959   BP: 120/81   Pulse: 92   Height: 63\"         Body mass index is 37.38 kg/m².    Physical Exam:     General: normal appearance  HEENT: normocephalic  Breast: normal contour  Respiratory: normal work of breathing, no extra use of accessory muscles  Cardiac: normal rate  Abdominal: Nontender   MSK: normal range of motion  Neuro: normal movement, normal sensory  Skin: no abnormalities seen    Pelvic Exam:    - no longer has vulvar rash!!!!  Normal vuvlar anatomy.  Speculum  - small asymptomatic urethral caruncle (hypoestrogenic)  - vaginal mucosa is pale and atrophic consistent w/ post menopausal low-estrogen state  - cervix without masses       Labs:  Lab Results   Component Value Date    WBC 6.5 12/12/2024    RBC 4.78 12/12/2024    HGB 14.6 12/12/2024    HCT 45.2 12/12/2024    MCV 94.6 12/12/2024    MCH 30.5 12/12/2024    MCHC 32.3 12/12/2024    RDW 14.2 12/12/2024    .0 12/12/2024        Lab Results   Component Value Date    GLU 92 12/12/2024    BUN 13 12/12/2024    CREATSERUM 0.74 12/12/2024    ANIONGAP 9 12/12/2024    CA 9.9 12/12/2024    OSMOCALC 288 12/12/2024    ALKPHO 89 12/12/2024    AST 27 12/12/2024    ALT 36 12/12/2024    BILT 0.3 12/12/2024    TP 8.0 12/12/2024    ALB 4.3 12/12/2024    GLOBULIN 3.7 (H) 12/12/2024     12/12/2024    K 4.1 12/12/2024     12/12/2024    CO2 27.0 12/12/2024       Lab Results   Component Value Date    CHOLEST 249 (H) 12/12/2024    TRIG 182 (H) 12/12/2024    HDL 57 12/12/2024     (H) 12/12/2024    VLDL 36 (H) 12/12/2024    NONHDLC 192 (H) 12/12/2024         Lab Results   Component Value Date    TSH 1.652 2024        Lab Results   Component Value Date     2024    A1C 5.8 (H) 2024       Imaging:  No results found.     Assessment:     Louann Castillo is a 59 year old  presenting for suspected vulvar candidiasis and suspected vulvar dermatoses and GSM    #Lichen simplex chronicus - from candida   #vulvar candidiasis  - punch biopsy: chronic dermatitis  - s/p diflucan and nystatin treatment   - continue clobetasol 2 night per week   - warm water only, no soap    #Genitourinary syndrome of menopause  - start vaginal estrogen 2 night per week     Follow up in 1 month    Cindy Quispe MD   [facility] - OBGYN    Note to patient and family:  The  Century Cures Act makes medical notes available to patients in the interest of transparency.  However, please be advised that this is a medical document.  It is intended as a peer to peer communication.  It is written in medical language and may contain abbreviations or verbiage that are technical and unfamiliar.  It may appear blunt or direct.  Medical documents are intended to carry relevant information, facts as evident, and the clinical opinion of the practitioner.          [1]   Current Outpatient Medications on File Prior to Visit   Medication Sig Dispense Refill    clobetasol 0.05 % External Ointment Apply to affected area twice daily for 4 weeks. Then taper to once daily for 4 weeks. Thereafter, apply 2-3 times per week. 120 g 3    CUSTOM MEDICATION Semaglutide/cyanocobalamin injection   0.25mg  Concentration: 1 / 0.5 mg/mL   Amount: 2.5 ML vial and supplies  Instructions: Inject 0.25ml subcutaneously   weekly 1 each 2    montelukast 10 MG Oral Tab Take 1 tablet (10 mg total) by mouth daily. 90 tablet 3    pantoprazole 20 MG Oral Tab EC Take 1 tablet (20 mg total) by mouth before breakfast. 90 tablet 3    simvastatin 20 MG Oral Tab Take 1 tablet (20 mg total) by mouth nightly. 90 tablet 3     traZODone 150 MG Oral Tab Take 1 tablet (150 mg total) by mouth nightly as needed for Sleep.      OXcarbazepine 300 MG Oral Tab Take 1 tablet (300 mg total) by mouth daily.      venlafaxine ER 75 MG Oral Capsule SR 24 Hr Take 1 capsule (75 mg total) by mouth daily.      Elastic Bandages & Supports (ACE ANKLE BRACE) Does not apply Misc Please provide patient with a CAM/walking boot. 1 each 0    cetirizine 1 MG/ML Oral Solution Take 5 mL (5 mg total) by mouth daily.      Venlafaxine HCl  MG Oral Capsule SR 24 Hr Take 1 capsule (150 mg total) by mouth 2 (two) times daily.       Current Facility-Administered Medications on File Prior to Visit   Medication Dose Route Frequency Provider Last Rate Last Admin    lidocaine 2%-EPINEPHrine 1:100,000 (Xylocaine-Epinephrine) injection  1.7 mL Subcutaneous Once        [2]   Allergies  Allergen Reactions    Other UNKNOWN    Wellbutrin [Bupropion] OTHER (SEE COMMENTS)     Skin breaks out when touched    Lamictal [Lamotrigine] RASH    Penicillins RASH

## 2025-07-01 DIAGNOSIS — E66.812 CLASS 2 SEVERE OBESITY DUE TO EXCESS CALORIES WITH SERIOUS COMORBIDITY AND BODY MASS INDEX (BMI) OF 37.0 TO 37.9 IN ADULT (HCC): ICD-10-CM

## 2025-07-01 DIAGNOSIS — E66.01 CLASS 2 SEVERE OBESITY DUE TO EXCESS CALORIES WITH SERIOUS COMORBIDITY AND BODY MASS INDEX (BMI) OF 37.0 TO 37.9 IN ADULT (HCC): ICD-10-CM

## 2025-07-02 NOTE — TELEPHONE ENCOUNTER
Please review; protocol failed/ has no protocol    Louann OHARA [facility] (supporting Nirali Jeffers DO)22 hours ago (12:35 PM)     JM  Refills have been requested for the following medications:         CUSTOM MEDICATION [Nirali Jeffers]      Patient Comment: I've lost 15 lbs so far without side effects. Please renew. Thank you.

## 2025-08-08 ENCOUNTER — OFFICE VISIT (OUTPATIENT)
Dept: FAMILY MEDICINE CLINIC | Facility: CLINIC | Age: 60
End: 2025-08-08

## 2025-08-08 VITALS
TEMPERATURE: 98 F | DIASTOLIC BLOOD PRESSURE: 72 MMHG | HEIGHT: 63 IN | BODY MASS INDEX: 33.66 KG/M2 | WEIGHT: 190 LBS | OXYGEN SATURATION: 98 % | HEART RATE: 93 BPM | RESPIRATION RATE: 18 BRPM | SYSTOLIC BLOOD PRESSURE: 112 MMHG

## 2025-08-08 DIAGNOSIS — E66.09 CLASS 1 OBESITY DUE TO EXCESS CALORIES WITH SERIOUS COMORBIDITY AND BODY MASS INDEX (BMI) OF 33.0 TO 33.9 IN ADULT: Primary | ICD-10-CM

## 2025-08-08 DIAGNOSIS — E78.2 MIXED HYPERLIPIDEMIA: ICD-10-CM

## 2025-08-08 DIAGNOSIS — R73.03 PREDIABETES: ICD-10-CM

## 2025-08-08 DIAGNOSIS — E66.811 CLASS 1 OBESITY DUE TO EXCESS CALORIES WITH SERIOUS COMORBIDITY AND BODY MASS INDEX (BMI) OF 33.0 TO 33.9 IN ADULT: Primary | ICD-10-CM

## 2025-08-08 PROCEDURE — 3074F SYST BP LT 130 MM HG: CPT | Performed by: FAMILY MEDICINE

## 2025-08-08 PROCEDURE — 99213 OFFICE O/P EST LOW 20 MIN: CPT | Performed by: FAMILY MEDICINE

## 2025-08-08 PROCEDURE — 3008F BODY MASS INDEX DOCD: CPT | Performed by: FAMILY MEDICINE

## 2025-08-08 PROCEDURE — 3078F DIAST BP <80 MM HG: CPT | Performed by: FAMILY MEDICINE

## (undated) NOTE — LETTER
Louann Castillo, :1965    CONSENT FOR PROCEDURE/SEDATION    1. I authorize the performance upon Louann Castillo  the following: Vulvar Biopsy    2. I authorize Dr. Cindy Quispe MD (and whomever is designated as the doctor’s assistant), to perform the above-mentioned procedures.    3. If any unforeseen conditions arise during this procedure calling for additional  procedures, operations, or medications (including anesthesia and blood transfusion), I further request and authorize the doctor to do whatever he/she deems advisable in my interest.    4. I consent to the taking and reproduction of any photographs in the course of this procedure for professional purposes.    5. I consent to the administration of such sedation as may be considered necessary or advisable by the physician responsible for this service, with the exception of ______________________________________________________    6. I have been informed by my doctor of the nature and purpose of this procedure sedation, possible alternative methods of treatment, risk involved and possible complications.    7. If I have a Do Not Resuscitate (DNR) order in place, the physician and I (or the individual authorized to consent on my behalf) will discuss and agree as to whether the Do Not Resuscitate (DNR) order will remain in effect or will be discontinued during the performance of the procedure and the applicable recovery period. If the Do Not Resuscitate (DNR) order is discontinued and is to be reinstated following the procedure/recovery period, the physician will determine when the applicable recovery period ends for purposes of reinstating the Do Not Resuscitate (DNR) order.    Signature of Patient:_______________________________________________    Signature of person authorized to consent for patient:  _______________________________________________________________    Relationship to patient:  ____________________________________________    Witness: _________________________________________ Date:___________     Physician Signature: _______________________________ Date:___________